# Patient Record
Sex: FEMALE | Race: WHITE
[De-identification: names, ages, dates, MRNs, and addresses within clinical notes are randomized per-mention and may not be internally consistent; named-entity substitution may affect disease eponyms.]

---

## 2019-03-12 ENCOUNTER — HOSPITAL ENCOUNTER (EMERGENCY)
Dept: HOSPITAL 41 - JD.ED | Age: 64
Discharge: HOME | End: 2019-03-12
Payer: COMMERCIAL

## 2019-03-12 DIAGNOSIS — E03.9: ICD-10-CM

## 2019-03-12 DIAGNOSIS — N39.0: Primary | ICD-10-CM

## 2019-03-12 DIAGNOSIS — Z79.899: ICD-10-CM

## 2019-03-12 PROCEDURE — 99283 EMERGENCY DEPT VISIT LOW MDM: CPT

## 2019-03-12 PROCEDURE — 87040 BLOOD CULTURE FOR BACTERIA: CPT

## 2019-03-12 PROCEDURE — 36415 COLL VENOUS BLD VENIPUNCTURE: CPT

## 2019-03-12 PROCEDURE — 96365 THER/PROPH/DIAG IV INF INIT: CPT

## 2019-03-12 PROCEDURE — 96361 HYDRATE IV INFUSION ADD-ON: CPT

## 2019-03-12 NOTE — EDM.PDOC
ED HPI GENERAL MEDICAL PROBLEM





- General


Chief Complaint: Genitourinary Problem


Stated Complaint: SENT BY  FOR IV AND ANTIBIOTICS


Time Seen by Provider: 03/12/19 17:57


Source of Information: Reports: Patient, RN Notes Reviewed


History Limitations: Reports: No Limitations





- History of Present Illness


INITIAL COMMENTS - FREE TEXT/NARRATIVE: 





Patient is a 63-year-old female who presents to the ED today for the evaluation 

of a UTI.  She was seen by Lavonne Hutchinson at the [facility] and she was found 

to have a "raging UTI" and an elevated white count at over 19,000.  The patient 

states that her symptoms started this last Thursday with dysuria, frequency, 

urgency.  She states that she was staying in a hotel somewhere of this weekend 

and noted that she had a fever.  She states she was unable to check this at the 

time because she did not have a thermometer.  She was started on oral Bactrim 

for management of the UTI and had only taken one dose of her antibiotic,  but 

was called today by Lavonne Hutchinson on the results of her blood work and 

urinalysis and was told to come to the ED for further evaluation, possibly for 

IV fluids and antibiotics.  The patient denies pain anywhere, chest pain, 

shortness of breath, she does have a small amount of nausea but has not had any 

vomiting or diarrhea.


Treatments PTA: Reports: Acetaminophen





- Related Data


 Allergies











Allergy/AdvReac Type Severity Reaction Status Date / Time


 


No Known Allergies Allergy   Verified 03/12/19 17:47











Home Meds: 


 Home Meds





Amitriptyline [Elavil] 50 mg PO DAILY 03/12/19 [History]


Ciprofloxacin [Ciprofloxacin HCl] 250 mg PO BID #14 tab 03/12/19 [Rx]


Citalopram [Citalopram HBr] 20 mg PO DAILY 03/12/19 [History]


Fluticasone Propionate [Flonase] 2 spray NASBOTH DAILY 03/12/19 [History]


Levothyroxine Sodium [Synthroid] 112 mcg PO DAILY 03/12/19 [History]


Loratadine [Claritin] 10 mg PO DAILY 03/12/19 [History]


SUMAtriptan [Sumatriptan] 1 spray MAYA ASDIRECTED PRN 03/12/19 [History]











Past Medical History


HEENT History: Reports: Impaired Vision, Sinusitis


Other HEENT History: wears eyeglasses.


Genitourinary History: Reports: UTI, Recurrent


OB/GYN History: Reports: Pregnancy


Musculoskeletal History: Reports: Fracture


Neurological History: Reports: Migraines


Endocrine/Metabolic History: Reports: Hypothyroidism


Hematologic History: Reports: Anemia





- Infectious Disease History


Infectious Disease History: Reports: Chicken Pox, Mumps





- Past Surgical History


HEENT Surgical History: Reports: Tonsillectomy


Female  Surgical History: Reports: Hysterectomy





Social & Family History





- Tobacco Use


Smoking Status *Q: Never Smoker


Second Hand Smoke Exposure: No





- Caffeine Use


Caffeine Use: Reports: Coffee, Soda





- Recreational Drug Use


Recreational Drug Use: No





ED ROS GENERAL





- Review of Systems


Review Of Systems: See Below


Constitutional: Reports: Fever.  Denies: Chills


HEENT: Reports: No Symptoms


Respiratory: Reports: No Symptoms


Cardiovascular: Reports: No Symptoms


Endocrine: Reports: No Symptoms


GI/Abdominal: Reports: Nausea.  Denies: Abdominal Pain, Constipation, Diarrhea, 

Vomiting


: Reports: Dysuria, Frequency, Urgency.  Denies: Flank Pain


Musculoskeletal: Reports: No Symptoms


Skin: Reports: No Symptoms


Neurological: Reports: No Symptoms


Psychiatric: Reports: No Symptoms


Hematologic/Lymphatic: Reports: No Symptoms


Immunologic: Reports: No Symptoms





ED EXAM, RENAL/





- Physical Exam


Exam: See Below


Exam Limited By: No Limitations


General Appearance: Alert, WD/WN, No Apparent Distress


Eye Exam: Bilateral Eye: EOMI, Normal Inspection


Ears: Normal External Exam


Nose: Normal Inspection


Throat/Mouth: Normal Inspection, Normal Lips, Normal Oropharynx, Normal Voice


Head: Atraumatic, Normocephalic


Neck: Normal Inspection


Respiratory/Chest: No Respiratory Distress, Lungs Clear, Normal Breath Sounds, 

No Accessory Muscle Use, Chest Non-Tender


Cardiovascular: Normal Peripheral Pulses, Regular Rate, Rhythm, No Murmur


GI/Abdominal: Normal Bowel Sounds, Soft, Non-Tender, No Distention, No Mass


Back Exam: Normal Inspection


Extremities: Normal Inspection, Normal Capillary Refill


Neurological: Alert, Oriented, Normal Cognition, No Motor/Sensory Deficits


Psychiatric: Normal Affect, Normal Mood


Skin Exam: Warm, Dry, Intact, Normal Color, No Rash





Course





- Vital Signs


Last Recorded V/S: 


 Last Vital Signs











Temp  97.5 F   03/12/19 18:10


 


Pulse  74   03/12/19 18:10


 


Resp  16   03/12/19 18:10


 


BP  98/67   03/12/19 18:10


 


Pulse Ox  97   03/12/19 18:10














- Orders/Labs/Meds


Orders: 


 Active Orders 24 hr











 Category Date Time Status


 


 Peripheral IV Care [RC] .AS DIRECTED Care  03/12/19 18:26 Ordered


 


 CULTURE BLOOD [BC] Stat Lab  03/12/19 18:29 Ordered


 


 CULTURE BLOOD [BC] Stat Lab  03/12/19 18:29 Ordered


 


 Sodium Chloride 0.9% [Normal Saline] 1,000 ml Med  03/12/19 18:30 Ordered





 IV ASDIRECTED   


 


 Sodium Chloride 0.9% [Saline Flush] Med  03/12/19 18:25 Ordered





 10 ml FLUSH ASDIRECTED PRN   


 


 cefTRIAXone [Rocephin] 2 gm Med  03/12/19 19:00 Active





 Sodium Chloride 0.9% [Normal Saline] 100 ml   





 IV Q24H   


 


 Blood Culture x2 Reflex Set [OM.PC] Stat Oth  03/12/19 18:28 Ordered


 


 Peripheral IV Insertion Adult [OM.PC] Routine Oth  03/12/19 18:24 Ordered








 Medication Orders





Sodium Chloride (Normal Saline)  1,000 mls @ 500 mls/hr IV ASDIRECTED MICKEY


   Last Admin: 03/12/19 18:52 Dose:  500 mls/hr


Ceftriaxone Sodium 2 gm/ (Sodium Chloride)  100 mls @ 200 mls/hr IV Q24H MICKEY


   Last Admin: 03/12/19 18:55 Dose:  200 mls/hr


Sodium Chloride (Saline Flush)  10 ml FLUSH ASDIRECTED PRN


   PRN Reason: Keep Vein Open


   Last Admin: 03/12/19 18:37 Dose:  10 ml








Meds: 


Medications











Generic Name Dose Route Start Last Admin





  Trade Name Freq  PRN Reason Stop Dose Admin


 


Sodium Chloride  1,000 mls @ 500 mls/hr  03/12/19 18:30  03/12/19 18:52





  Normal Saline  IV   500 mls/hr





  ASDIRECTED MICKEY   Administration





     





     





     





     


 


Ceftriaxone Sodium 2 gm/  100 mls @ 200 mls/hr  03/12/19 19:00  03/12/19 18:55





  Sodium Chloride  IV   200 mls/hr





  Q24H MICKEY   Administration





     





     





     





     


 


Sodium Chloride  10 ml  03/12/19 18:25  03/12/19 18:37





  Saline Flush  FLUSH   10 ml





  ASDIRECTED PRN   Administration





  Keep Vein Open   





     





     





     














Discontinued Medications














Generic Name Dose Route Start Last Admin





  Trade Name Freq  PRN Reason Stop Dose Admin


 


Ceftriaxone Sodium  2 gm  03/12/19 18:27  03/12/19 18:53





  Rocephin  IV  03/12/19 18:28  Not Given





  ONETIME ONE   





     





     





     





     


 


Ceftriaxone Sodium  Confirm  03/12/19 18:45  03/12/19 18:53





  Rocephin  Administered  03/12/19 18:46  Not Given





  Dose   





  2 gm   





  IV   





  .STK-MED ONE   





     





     





     





     


 


Sodium Chloride  Confirm  03/12/19 18:46  03/12/19 18:53





  Normal Saline  Administered  03/12/19 18:47  Not Given





  Dose   





  100 mls @ as directed   





  .ROUTE   





  .STK-MED ONE   





     





     





     





     














- Re-Assessments/Exams


Free Text/Narrative Re-Assessment/Exam: 





03/12/19 19:21


Patient presents to the ED for evaluation of urinary symptoms.  The nurse at 

initial time of triage stated that she was more septic looking.  At my time of 

evaluation the patient does not appear to exhibit these septic like symptoms.  

She states that she does have low blood pressure from time to time and does 

feel as if this might be the case today, she did have a blood pressure of 96/57 

at initial evaluation.  I have ordered IV fluids, 2 g Rocephin IV in management 

of this.  I have also ordered 2 sets of blood cultures to figure out why her 

white blood cell count might be 19.9.  Her UA at the [facility] is as follows

: Color mandeep appearance cloudy pH 6.0 specific gravity 1.020 protein urine +2 

glucose urine negative ketones urine trace Rosy Watts urine +1 occult blood 

urine +2 urobilinogen urine 4.0 nitrite positive leuk esterase +2 this was a 

clean catch urine.  Urine microscopy was sent to our facility for further 

evaluation as their facility does not have the ability to do urine microscopy 

there.  Her CBC is as follows: White blood cell count 19.9 red blood cell count 

3.9 hemoglobin 12.6 hematocrit 36.7% MCV 93.6 MCH 32.1 MCHC 34.3 Red cell 

distribution with 46.4 and platelet count of 173.  This was done without a 

differential as well.  He did have a metabolic panel done at that clinic and it 

was also sent to this this facility for further evaluation as they do not have 

the ability to run a metabolic panel at their facility.  They also did obtain a 

chest x-ray at the clinic and there was nothing acute appreciated on the two-

view x-ray.  It appears as if Lavonne did send the patient's urine in for 

culture as well.





03/12/19 20:30


Patient's antibiotic is done and she has got about half a bag of fluid.  She 

states that she feels better, I will send her home with a prescription for oral 

ciprofloxacin 250 mg by mouth twice daily for 7 days.  This will be sent to 

[facility] as per her choice.  She will pick these up in the morning.  I 

did recommend that she follow up in clinic by the end of this week as well.  

She will be notified if her urine culture results state that she needs 

different antibiotic therapy.





Departure





- Departure


Time of Disposition: 20:31


Disposition: Home, Self-Care 01


Condition: Fair


Clinical Impression: 


 UTI, Urinary tract infectious disease








- Discharge Information


*PRESCRIPTION DRUG MONITORING PROGRAM REVIEWED*: No


*COPY OF PRESCRIPTION DRUG MONITORING REPORT IN PATIENT CORNELL: No


Prescriptions: 


Ciprofloxacin [Ciprofloxacin HCl] 250 mg PO BID #14 tab


Instructions:  Urinary Tract Infection, Adult, Easy-to-Read


Referrals: 


Abimbola Hutchinson PA-C [Primary Care Provider] - 


Forms:  ED Department Discharge


Additional Instructions: 


You have been evaluated in the ED today for your urinary tract infection. 





You have been given 2 g of Rocephin IV in the ER tonight this should kick start 

her antibiotic coverage.  Also given IV fluids to help with the symptoms.





You have been provided with a prescription for ciprofloxacin this is an 

antibiotic for your UTI, has been electronic was sent to the [facility] 

please pick this up tomorrow and take as directed.





Please increase your fluid intake as this also helps flush the bacteria out of 

her system.





You will be called or notified if you need a change in antibiotics as your 

urine culture is pending.





You may take Tylenol for your symptomatic fever.  Recommend that you follow up 

at [facility] sometime in end of this week for reevaluation.





Please return to the ED if your symptoms change or worsen.





- My Orders


Last 24 Hours: 


My Active Orders





03/12/19 18:24


Peripheral IV Insertion Adult [OM.PC] Routine 





03/12/19 18:25


Sodium Chloride 0.9% [Saline Flush]   10 ml FLUSH ASDIRECTED PRN 





03/12/19 18:26


Peripheral IV Care [RC] .AS DIRECTED 





03/12/19 18:28


Blood Culture x2 Reflex Set [OM.PC] Stat 





03/12/19 18:29


CULTURE BLOOD [BC] Stat 


CULTURE BLOOD [BC] Stat 





03/12/19 18:30


Sodium Chloride 0.9% [Normal Saline] 1,000 ml IV ASDIRECTED 





03/12/19 19:00


cefTRIAXone [Rocephin] 2 gm   Sodium Chloride 0.9% [Normal Saline] 100 ml IV 

Q24H 














- Assessment/Plan


Last 24 Hours: 


My Active Orders





03/12/19 18:24


Peripheral IV Insertion Adult [OM.PC] Routine 





03/12/19 18:25


Sodium Chloride 0.9% [Saline Flush]   10 ml FLUSH ASDIRECTED PRN 





03/12/19 18:26


Peripheral IV Care [RC] .AS DIRECTED 





03/12/19 18:28


Blood Culture x2 Reflex Set [OM.PC] Stat 





03/12/19 18:29


CULTURE BLOOD [BC] Stat 


CULTURE BLOOD [BC] Stat 





03/12/19 18:30


Sodium Chloride 0.9% [Normal Saline] 1,000 ml IV ASDIRECTED 





03/12/19 19:00


cefTRIAXone [Rocephin] 2 gm   Sodium Chloride 0.9% [Normal Saline] 100 ml IV 

Q24H

## 2019-03-14 ENCOUNTER — HOSPITAL ENCOUNTER (INPATIENT)
Dept: HOSPITAL 41 - JD.ED | Age: 64
LOS: 4 days | Discharge: HOME | DRG: 689 | End: 2019-03-18
Payer: COMMERCIAL

## 2019-03-14 DIAGNOSIS — Z90.710: ICD-10-CM

## 2019-03-14 DIAGNOSIS — A41.51: ICD-10-CM

## 2019-03-14 DIAGNOSIS — Z66: ICD-10-CM

## 2019-03-14 DIAGNOSIS — Z87.440: ICD-10-CM

## 2019-03-14 DIAGNOSIS — E80.4: ICD-10-CM

## 2019-03-14 DIAGNOSIS — Z79.890: ICD-10-CM

## 2019-03-14 DIAGNOSIS — G43.909: ICD-10-CM

## 2019-03-14 DIAGNOSIS — D64.9: ICD-10-CM

## 2019-03-14 DIAGNOSIS — N17.9: ICD-10-CM

## 2019-03-14 DIAGNOSIS — K83.09: ICD-10-CM

## 2019-03-14 DIAGNOSIS — H54.7: ICD-10-CM

## 2019-03-14 DIAGNOSIS — N39.0: Primary | ICD-10-CM

## 2019-03-14 DIAGNOSIS — E03.9: ICD-10-CM

## 2019-03-14 DIAGNOSIS — E83.42: ICD-10-CM

## 2019-03-14 DIAGNOSIS — Z79.899: ICD-10-CM

## 2019-03-14 DIAGNOSIS — J98.11: ICD-10-CM

## 2019-03-14 PROCEDURE — A9537 TC99M MEBROFENIN: HCPCS

## 2019-03-14 RX ADMIN — POTASSIUM CHLORIDE SCH MEQ: 1500 TABLET, EXTENDED RELEASE ORAL at 22:25

## 2019-03-14 NOTE — EDM.PDOC
ED HPI GENERAL MEDICAL PROBLEM





- General


Chief Complaint: Fever


Stated Complaint: SENT BY DR


Time Seen by Provider: 03/14/19 16:51


Source of Information: Reports: Patient, Family, Provider


History Limitations: Reports: No Limitations





- History of Present Illness


INITIAL COMMENTS - FREE TEXT/NARRATIVE: 





The patient presents from the [place] clinic with fevers, UTI and bacteremia.  

She was seen a few days ago at the clinic in [place] for a bad UTI.  Her urine 

was positive for a UTI and her WBC was elevated at 19.  She was sent here for 

further management.  Blood cultures were obtained, fluids were given and a CMP 

was done.  She was given rocephin here and put on cipro.  She then followed up 

with Lavonne Hutchinson.  Her WBC was coming down and her Bili and liver enzymes 

were coming down.  She was given another shot of rocephin.  Her blood cultures 

came back positive for E-coli.  Those results were called to Lavonne at the 

clinic by Dr Doherty.  He felt she could be treated outpatient.  She is still 

having fevers and chills.  She is able to eat and drink.  She feels a little 

better.  Lavonne did order an US tomorrow because the patient looked a little 

jaundice.


Onset: Gradual


Duration: Day(s):


Location: Reports: Other (dysuria)


Quality: Reports: Burning


Severity: Mild


Improves with: Reports: None


Worsens with: Reports: None


Associated Symptoms: Reports: Fever/Chills.  Denies: Chest Pain, Cough, 

Headaches, Nausea/Vomiting, Shortness of Breath





- Related Data


 Allergies











Allergy/AdvReac Type Severity Reaction Status Date / Time


 


No Known Allergies Allergy   Verified 03/14/19 16:56











Home Meds: 


 Home Meds





Amitriptyline [Elavil] 50 mg PO DAILY 03/12/19 [History]


Ciprofloxacin [Ciprofloxacin HCl] 250 mg PO BID #14 tab 03/12/19 [Rx]


Citalopram [Citalopram HBr] 20 mg PO DAILY 03/12/19 [History]


Fluticasone Propionate [Flonase] 2 spray NASBOTH DAILY 03/12/19 [History]


Levothyroxine Sodium [Synthroid] 112 mcg PO DAILY 03/12/19 [History]


Loratadine [Claritin] 10 mg PO DAILY 03/12/19 [History]


SUMAtriptan [Sumatriptan] 1 spray MAYA ASDIRECTED PRN 03/12/19 [History]











Past Medical History


HEENT History: Reports: Impaired Vision, Sinusitis


Other HEENT History: wears eyeglasses.


Genitourinary History: Reports: UTI, Recurrent


OB/GYN History: Reports: Pregnancy


Musculoskeletal History: Reports: Fracture


Neurological History: Reports: Migraines


Endocrine/Metabolic History: Reports: Hypothyroidism


Hematologic History: Reports: Anemia





- Infectious Disease History


Infectious Disease History: Reports: Chicken Pox, Mumps





- Past Surgical History


HEENT Surgical History: Reports: Tonsillectomy


Female  Surgical History: Reports: Hysterectomy





Social & Family History





- Tobacco Use


Smoking Status *Q: Unknown Ever Smoked





- Caffeine Use


Caffeine Use: Reports: Coffee, Soda





ED ROS GENERAL





- Review of Systems


Review Of Systems: See Below


Constitutional: Reports: Fever, Chills


HEENT: Reports: No Symptoms


Respiratory: Reports: No Symptoms


Cardiovascular: Reports: No Symptoms


Endocrine: Reports: No Symptoms


GI/Abdominal: Reports: No Symptoms


: Reports: No Symptoms





ED EXAM, SEPSIS





- Physical Exam


Exam: See Below


Exam Limited By: No Limitations


General Appearance: Alert, No Apparent Distress


Ears: Normal External Exam


Nose: Normal Inspection


Head: Atraumatic, Normocephalic


Neck: Normal Inspection


Respiratory/Chest: No Respiratory Distress, Lungs Clear, Normal Breath Sounds


Cardiovascular: Regular Rate, Rhythm, No Edema, No Murmur


GI/Abdominal Exam: Soft, Non-Tender, No Organomegaly, No Mass


Back: Normal Inspection


Extremities: Normal Inspection





Course





- Vital Signs


Last Recorded V/S: 


 Last Vital Signs











Temp  98.3 F   03/14/19 16:53


 


Pulse  84   03/14/19 16:53


 


Resp  16   03/14/19 16:53


 


BP  110/84   03/14/19 16:53


 


Pulse Ox  98   03/14/19 16:53














- Orders/Labs/Meds


Orders: 


 Active Orders 24 hr











 Category Date Time Status


 


 Cardiac Monitoring [RC] .AS DIRECTED Care  03/14/19 17:30 Active


 


 Peripheral IV Care [RC] .AS DIRECTED Care  03/14/19 17:30 Active


 


 C-REACTIVE PROTEIN [CHEM] Stat Lab  03/14/19 17:50 Results


 


 COMPREHENSIVE METABOLIC PN,CMP [CHEM] Stat Lab  03/14/19 17:50 Results


 


 Sodium Chloride 0.9% [Normal Saline] 1,000 ml Med  03/14/19 17:30 Active





 IV ASDIRECTED   


 


 Sodium Chloride 0.9% [Saline Flush] Med  03/14/19 17:30 Active





 10 ml FLUSH ASDIRECTED PRN   


 


 Peripheral IV Insertion Adult [OM.PC] Stat Oth  03/14/19 17:30 Ordered








 Medication Orders





Sodium Chloride (Normal Saline)  1,000 mls @ 125 mls/hr IV ASDIRECTED MICKEY


   Last Admin: 03/14/19 17:56  Dose: 125 mls/hr


Sodium Chloride (Saline Flush)  10 ml FLUSH ASDIRECTED PRN


   PRN Reason: Keep Vein Open


   Last Admin: 03/14/19 17:57  Dose: 10 ml








Labs: 


 Laboratory Tests











  03/14/19 03/14/19 Range/Units





  17:50 17:50 


 


WBC  7.90   (3.98-10.04)  K/mm3


 


RBC  3.47 L   (3.98-5.22)  M/mm3


 


Hgb  10.6 L   (11.2-15.7)  gm/L


 


Hct  31.5 L   (34.1-44.9)  %


 


MCV  90.8   (79.4-94.8)  fl


 


MCH  30.5   (25.6-32.2)  pg


 


MCHC  33.7   (32.2-35.5)  g/dl


 


RDW Std Deviation  42.3   (36.4-46.3)  fL


 


Plt Count  183   (182-369)  K/mm3


 


MPV  10.7   (9.4-12.3)  fl


 


Neut % (Auto)  62.5   (34.0-71.1)  %


 


Lymph % (Auto)  16.1 L   (19.3-51.7)  %


 


Mono % (Auto)  19.6 H   (4.7-12.5)  %


 


Eos % (Auto)  0.9   (0.7-5.8)  


 


Baso % (Auto)  0.3   (0.1-1.2)  %


 


Neut # (Auto)  4.94   (1.56-6.13)  K/mm3


 


Lymph # (Auto)  1.27   (1.18-3.74)  K/mm3


 


Mono # (Auto)  1.55 H   (0.24-0.36)  K/mm3


 


Eos # (Auto)  0.07   (0.04-0.36)  K/mm3


 


Baso # (Auto)  0.02   (0.01-0.08)  K/mm3


 


Manual Slide Review  Abnormal smear   


 


Sodium   134 L  (136-145)  mEq/L


 


Potassium   3.6  (3.5-5.1)  mEq/L


 


Chloride   102  ()  mEq/L


 


Carbon Dioxide   20 L  (21-32)  mEq/L


 


Anion Gap   15.6 H  (5-15)  


 


BUN   18  (7-18)  mg/dL


 


Creatinine   1.4 H  (0.55-1.02)  mg/dL


 


Est Cr Clr Drug Dosing   TNP  


 


Estimated GFR (MDRD)   38  (>60)  mL/min


 


BUN/Creatinine Ratio   12.9 L  (14-18)  


 


Glucose   106  ()  mg/dL


 


Calcium   8.5  (8.5-10.1)  mg/dL


 


Total Bilirubin   0.8  (0.2-1.0)  mg/dL


 


AST   40 H  (15-37)  U/L


 


ALT   61 H  (14-59)  U/L


 


Alkaline Phosphatase   130 H  ()  U/L


 


Total Protein   6.5  (6.4-8.2)  g/dl


 


Albumin   2.5 L  (3.4-5.0)  g/dl


 


Globulin   4.0  gm/dL


 


Albumin/Globulin Ratio   0.6 L  (1-2)  











Meds: 


Medications











Generic Name Dose Route Start Last Admin





  Trade Name Freq  PRN Reason Stop Dose Admin


 


Sodium Chloride  1,000 mls @ 125 mls/hr  03/14/19 17:30  03/14/19 17:56





  Normal Saline  IV   125 mls/hr





  ASDIRECTED MICKEY   Administration





     





     





     





     


 


Sodium Chloride  10 ml  03/14/19 17:30  03/14/19 17:57





  Saline Flush  FLUSH   10 ml





  ASDIRECTED PRN   Administration





  Keep Vein Open   





     





     





     














Discontinued Medications














Generic Name Dose Route Start Last Admin





  Trade Name Freq  PRN Reason Stop Dose Admin


 


Ceftriaxone Sodium 2 gm/  100 mls @ 200 mls/hr  03/14/19 17:31  03/14/19 18:00





  Sodium Chloride  IV  03/14/19 18:00  Not Given





  ONETIME ONE   





     





     





     





     


 


Ceftriaxone Sodium 2 gm/  100 mls @ 200 mls/hr  03/14/19 17:45  03/14/19 17:56





  Sodium Chloride  IV  03/14/19 18:14  200 mls/hr





  ONETIME ONE   Administration





     





     





     





     














- Re-Assessments/Exams


Free Text/Narrative Re-Assessment/Exam: 





03/14/19 18:18


I ordered an IV NS at 125mL/hr, rocephin 2 grams IV, and labs.


03/14/19 18:25


Her WBC is normal now at 7.9.  ON 3/12 it was 19 and on 3/13 it was 10.92.  Her 

creatinine is still elevated at 1.4.  Her total bili has improved from 1.9 to 

0.8.  Her AST has improved from 53 to 40.  Her ALT has improved from 76 to 61.  

Her alk phos is elevated at 130.  I did order an US of her RUQ because of the 

elevated bili.  She had no pain in the RUQ though.  She told me that she last 

ate at noon but when the tech came in to do the US she admitted to eating a 

rice crispy bar at about 3:30.  They do have a spot in the morning as needed.


03/14/19 18:30


I called Dr Rubio and he agreed to the admission.





Departure





- Departure


Time of Disposition: 18:35


Disposition: Admitted As Inpatient 68


Condition: Fair


Clinical Impression: 


 UTI, Urinary tract infectious disease, Bacteremia








- Discharge Information


Referrals: 


Abimbola Hutchinson PA-C [Primary Care Provider] - 


Forms:  ED Department Discharge





- My Orders


Last 24 Hours: 


My Active Orders





03/14/19 17:30


Cardiac Monitoring [RC] .AS DIRECTED 


Peripheral IV Care [RC] .AS DIRECTED 


Sodium Chloride 0.9% [Normal Saline] 1,000 ml IV ASDIRECTED 


Sodium Chloride 0.9% [Saline Flush]   10 ml FLUSH ASDIRECTED PRN 


Peripheral IV Insertion Adult [OM.PC] Stat 





03/14/19 17:50


C-REACTIVE PROTEIN [CHEM] Stat 


COMPREHENSIVE METABOLIC PN,CMP [CHEM] Stat 














- Assessment/Plan


Last 24 Hours: 


My Active Orders





03/14/19 17:30


Cardiac Monitoring [RC] .AS DIRECTED 


Peripheral IV Care [RC] .AS DIRECTED 


Sodium Chloride 0.9% [Normal Saline] 1,000 ml IV ASDIRECTED 


Sodium Chloride 0.9% [Saline Flush]   10 ml FLUSH ASDIRECTED PRN 


Peripheral IV Insertion Adult [OM.PC] Stat 





03/14/19 17:50


C-REACTIVE PROTEIN [CHEM] Stat 


COMPREHENSIVE METABOLIC PN,CMP [CHEM] Stat

## 2019-03-14 NOTE — PCM.SN
- Free Text/Narrative


Note: 


Briefly seen and examined patient at bedside. She was recently diagnosed with 

UTI and was found to have Bacteremia on her blood culture due to E. coli. She 

has been treated outpatient with oral antibiotic and received IV Rocephin in ED 

prior to coming in to the floor. She denies having any  issues. She looks 

clinically stable and her vitals on presentation are within normal limits. She 

has no leukocytosis but her CRP is considerably high at 23.9. Patient is 

essentially coming in for treatment of Bacteremia 2/2 UTI. Our plan is to 

repeat blood culture, screen for viral infection, continue antibiotic and 

supportive care. Discharge pending result of repeat blood culture.

## 2019-03-15 RX ADMIN — POTASSIUM CHLORIDE SCH MEQ: 1500 TABLET, EXTENDED RELEASE ORAL at 00:57

## 2019-03-15 RX ADMIN — BENZOCAINE AND MENTHOL PRN LOZENGE: 15; 3.6 LOZENGE ORAL at 20:41

## 2019-03-15 RX ADMIN — Medication SCH MG: at 09:09

## 2019-03-15 RX ADMIN — BENZOCAINE AND MENTHOL PRN LOZENGE: 15; 3.6 LOZENGE ORAL at 12:16

## 2019-03-15 NOTE — PCM.HP
H&P History of Present Illness





- General


Date of Service: 03/15/19


Admit Problem/Dx: 


 Admission Diagnosis/Problem





Admission Diagnosis/Problem      Bacteremia








Source of Information: Patient, Old Records, Provider, RN, RN Notes Reviewed


History Limitations: Reports: No Limitations





- History of Present Illness


Initial Comments - Free Text/Narative: 


Bhavna Lowe is a 62 yo female who presented to our ED from [facility] with 

fevers, UTI, and bacteremia.  She was seen in the [facility] and diagnosed 

with UTI.  Her WBC was also elevated at 19.  She was sent to the ED for further 

management and at that time blood cultures were obtained fluids were given and 

a CMP was done.  She is given a dose of Rocephin and put on by mouth Cipro and 

discharged to follow up with Jessy Hutchinson PA-C.  In follow-up WBC was noted 

to be coming down along with her bilirubin and liver enzymes.  She received 

another shot of Rocephin.  Her blood cultures and returned positive for 

Escherichia coli and these were reported back to her PCP by the ED provider.  

At that time it was felt she could be treated as outpatient however she 

continued to have fever and chills.  Reports she is still able to eat and drink 

and does feel a little better.  Abdominal ultrasound was ordered and originally 

was going to be completed outpatient as it was thought she looked well jaundice.





In the ED Was 98.3.  Pulse 84.  Respirations 16.  Blood pressure 110/84.  Pulse 

ox 98%.  Labs are obtained: WBC 7.90.  Hemoglobin 10.6.  Hematocrit 31.5.  She 

is normocytic.  Neutrophils are normal at 60.5.  Sodium is just a smidge low at 

134.  Potassium 3.6.  Chloride 102.  Corrected 20.  Anion gap is 15.6.  BUN is 

18.  Creatinine is 1.4.  GFR is 38.  Glucose 106.  Calcium 8.5.  Total 

bilirubin 0.8.  AST is 40, ALT 61, alkaline phosphatase 1:30.  Protein is 6.5.  

Albumin is low at 2.5.  She started on IV fluids and 2 g Rocephin.  She is 

reporting pain in the right upper quadrant.





She carries a history of impaired vision, recurrent UTIs, migraines, 

hypothyroidism, anemia.  She is a DNR/DNI.  Her PCP is Jessy Hutchinson PA-C. 








- Related Data


Allergies/Adverse Reactions: 


 Allergies











Allergy/AdvReac Type Severity Reaction Status Date / Time


 


No Known Allergies Allergy   Verified 03/14/19 19:38











Home Medications: 


 Home Meds





Amitriptyline [Elavil] 50 mg PO DAILY 03/12/19 [History]


Ciprofloxacin [Ciprofloxacin HCl] 250 mg PO BID #14 tab 03/12/19 [Rx]


Citalopram [Citalopram HBr] 20 mg PO DAILY 03/12/19 [History]


Fluticasone Propionate [Flonase] 2 spray NASBOTH DAILY 03/12/19 [History]


Levothyroxine Sodium [Synthroid] 112 mcg PO DAILY 03/12/19 [History]


Loratadine [Claritin] 10 mg PO DAILY 03/12/19 [History]


SUMAtriptan [Sumatriptan] 1 spray MAYA ASDIRECTED PRN 03/12/19 [History]











Past Medical History


HEENT History: Reports: Impaired Vision, Sinusitis


Other HEENT History: wears eyeglasses.


Genitourinary History: Reports: UTI, Recurrent


OB/GYN History: Reports: Pregnancy


Musculoskeletal History: Reports: Fracture


Neurological History: Reports: Migraines


Endocrine/Metabolic History: Reports: Hypothyroidism


Hematologic History: Reports: Anemia





- Infectious Disease History


Infectious Disease History: Reports: Chicken Pox, Mumps





- Past Surgical History


HEENT Surgical History: Reports: Tonsillectomy


Female  Surgical History: Reports: Hysterectomy


Endocrine Surgical History: Reports: None


Neurological Surgical History: Reports: None





Social & Family History





- Family History


Family Medical History: Noncontributory





- Tobacco Use


Smoking Status *Q: Never Smoker


Second Hand Smoke Exposure: No





- Caffeine Use


Caffeine Use: Reports: None





- Recreational Drug Use


Recreational Drug Use: No





H&P Review of Systems





- Review of Systems:


Review Of Systems: See Below


General: Reports: No Symptoms.  Denies: Fever, Chills, Malaise, Weakness, 

Fatigue


HEENT: Reports: No Symptoms.  Denies: Headaches, Sore Throat


Pulmonary: Reports: No Symptoms.  Denies: Shortness of Breath, Wheezing, 

Pleuritic Chest Pain, Cough, Sputum


Cardiovascular: Reports: No Symptoms.  Denies: Chest Pain, Palpitations, 

Dyspnea on Exertion, Edema, Lightheadedness


Gastrointestinal: Reports: No Symptoms.  Denies: Abdominal Pain, Constipation, 

Diarrhea, Nausea, Vomiting


Genitourinary: Reports: No Symptoms.  Denies: Frequency, Pain


Musculoskeletal: Reports: No Symptoms


Skin: Reports: No Symptoms.  Denies: Cyanosis, Jaundice


Psychiatric: Reports: No Symptoms.  Denies: Confusion


Neurological: Reports: No Symptoms.  Denies: Trouble Speaking, Difficulty 

Walking


Hematologic/Lymphatic: Reports: No Symptoms


Immunologic: Reports: No Symptoms





Exam





- Exam


Exam: See Below





- Vital Signs


Vital Signs: 


 Last Vital Signs











Temp  99.1 F   03/15/19 09:05


 


Pulse  76   03/15/19 09:05


 


Resp  16   03/15/19 09:05


 


BP  95/68   03/15/19 09:05


 


Pulse Ox  98   03/15/19 09:05











Weight: 172 lb 1.6 oz





- Exam


Quality Assessment: DVT Prophylaxis


General: Alert, Oriented, Cooperative.  No: Mild Distress


HEENT: Conjunctiva Clear, EACs Clear, EOMI, Hearing Intact, Mucosa Moist & Pink

, Nares Patent, Posterior Pharynx Clear, PERRLA


Neck: Supple, Trachea Midline


Lungs: Clear to Auscultation, Normal Respiratory Effort


Cardiovascular: Regular Rate, Regular Rhythm


GI/Abdominal Exam: Normal Bowel Sounds, Soft, Non-Tender, No Organomegaly, No 

Distention, No Abnormal Bruit, No Mass


 (Female) Exam: Deferred


Rectal (Female) Exam: Deferred


Back Exam: Normal Inspection, Full Range of Motion


Extremities: Normal Inspection, Normal Range of Motion, Non-Tender, No Pedal 

Edema, Normal Capillary Refill


Peripheral Pulses: 2+: Radial (L), Radial (R), Dorsalis Pedis (L), Dorsalis 

Pedis (R)


Skin: Warm, Dry, Intact


Neurological: Cranial Nerves Intact (grossly)


Neuro Extensive - Mental Status: Alert, Oriented x3, Normal Mood/Affect





- Patient Data


Lab Results Last 24 hrs: 


 Laboratory Results - last 24 hr











  03/14/19 03/14/19 03/15/19 Range/Units





  17:50 17:50 05:52 


 


WBC  7.90   7.45  (3.98-10.04)  K/mm3


 


RBC  3.47 L   3.37 L  (3.98-5.22)  M/mm3


 


Hgb  10.6 L   10.2 L  (11.2-15.7)  gm/L


 


Hct  31.5 L   30.9 L  (34.1-44.9)  %


 


MCV  90.8   91.7  (79.4-94.8)  fl


 


MCH  30.5   30.3  (25.6-32.2)  pg


 


MCHC  33.7   33.0  (32.2-35.5)  g/dl


 


RDW Std Deviation  42.3   43.0  (36.4-46.3)  fL


 


Plt Count  183   186  (182-369)  K/mm3


 


MPV  10.7   11.1  (9.4-12.3)  fl


 


Neut % (Auto)  62.5   60.1  (34.0-71.1)  %


 


Lymph % (Auto)  16.1 L   17.3 L  (19.3-51.7)  %


 


Mono % (Auto)  19.6 H   19.3 H  (4.7-12.5)  %


 


Eos % (Auto)  0.9   1.7  (0.7-5.8)  


 


Baso % (Auto)  0.3   0.5  (0.1-1.2)  %


 


Neut # (Auto)  4.94   4.47  (1.56-6.13)  K/mm3


 


Lymph # (Auto)  1.27   1.29  (1.18-3.74)  K/mm3


 


Mono # (Auto)  1.55 H   1.44 H  (0.24-0.36)  K/mm3


 


Eos # (Auto)  0.07   0.13  (0.04-0.36)  K/mm3


 


Baso # (Auto)  0.02   0.04  (0.01-0.08)  K/mm3


 


Manual Slide Review  Abnormal smear   Normal smear  


 


Sodium   134 L   (136-145)  mEq/L


 


Potassium   3.6   (3.5-5.1)  mEq/L


 


Chloride   102   ()  mEq/L


 


Carbon Dioxide   20 L   (21-32)  mEq/L


 


Anion Gap   15.6 H   (5-15)  


 


BUN   18   (7-18)  mg/dL


 


Creatinine   1.4 H   (0.55-1.02)  mg/dL


 


Est Cr Clr Drug Dosing   TNP   


 


Estimated GFR (MDRD)   38   (>60)  mL/min


 


BUN/Creatinine Ratio   12.9 L   (14-18)  


 


Glucose   106   ()  mg/dL


 


Calcium   8.5   (8.5-10.1)  mg/dL


 


Magnesium     (1.8-2.4)  mg/dl


 


Total Bilirubin   0.8   (0.2-1.0)  mg/dL


 


AST   40 H   (15-37)  U/L


 


ALT   61 H   (14-59)  U/L


 


Alkaline Phosphatase   130 H   ()  U/L


 


C-Reactive Protein   23.9 H*   (<1.0)  mg/dL


 


Total Protein   6.5   (6.4-8.2)  g/dl


 


Albumin   2.5 L   (3.4-5.0)  g/dl


 


Globulin   4.0   gm/dL


 


Albumin/Globulin Ratio   0.6 L   (1-2)  


 


Urine Color     (Yellow)  


 


Urine Appearance     (Clear)  


 


Urine pH     (5.0-8.0)  


 


Ur Specific Gravity     (1.005-1.030)  


 


Urine Protein     (Negative)  


 


Urine Glucose (UA)     (Negative)  


 


Urine Ketones     (Negative)  


 


Urine Occult Blood     (Negative)  


 


Urine Nitrite     (Negative)  


 


Urine Bilirubin     (Negative)  


 


Urine Urobilinogen     (0.2-1.0)  


 


Ur Leukocyte Esterase     (Negative)  


 


Urine RBC     (0-5)  /hpf


 


Urine WBC     (0-5)  /hpf


 


Urine WBC Clumps     (NOT SEEN)  /hpf


 


Ur Epithelial Cells     (0-5)  /hpf


 


Urine Bacteria     (FEW)  /hpf


 


Urine Mucus     (FEW)  /hpf














  03/15/19 03/15/19 Range/Units





  05:52 07:20 


 


WBC    (3.98-10.04)  K/mm3


 


RBC    (3.98-5.22)  M/mm3


 


Hgb    (11.2-15.7)  gm/L


 


Hct    (34.1-44.9)  %


 


MCV    (79.4-94.8)  fl


 


MCH    (25.6-32.2)  pg


 


MCHC    (32.2-35.5)  g/dl


 


RDW Std Deviation    (36.4-46.3)  fL


 


Plt Count    (182-369)  K/mm3


 


MPV    (9.4-12.3)  fl


 


Neut % (Auto)    (34.0-71.1)  %


 


Lymph % (Auto)    (19.3-51.7)  %


 


Mono % (Auto)    (4.7-12.5)  %


 


Eos % (Auto)    (0.7-5.8)  


 


Baso % (Auto)    (0.1-1.2)  %


 


Neut # (Auto)    (1.56-6.13)  K/mm3


 


Lymph # (Auto)    (1.18-3.74)  K/mm3


 


Mono # (Auto)    (0.24-0.36)  K/mm3


 


Eos # (Auto)    (0.04-0.36)  K/mm3


 


Baso # (Auto)    (0.01-0.08)  K/mm3


 


Manual Slide Review    


 


Sodium  139   (136-145)  mEq/L


 


Potassium  4.6   (3.5-5.1)  mEq/L


 


Chloride  109 H   ()  mEq/L


 


Carbon Dioxide  21   (21-32)  mEq/L


 


Anion Gap  13.6   (5-15)  


 


BUN  15   (7-18)  mg/dL


 


Creatinine  1.2 H   (0.55-1.02)  mg/dL


 


Est Cr Clr Drug Dosing  56.81   


 


Estimated GFR (MDRD)  46   (>60)  mL/min


 


BUN/Creatinine Ratio  12.5 L   (14-18)  


 


Glucose  109   ()  mg/dL


 


Calcium  8.4 L   (8.5-10.1)  mg/dL


 


Magnesium  1.9   (1.8-2.4)  mg/dl


 


Total Bilirubin    (0.2-1.0)  mg/dL


 


AST    (15-37)  U/L


 


ALT    (14-59)  U/L


 


Alkaline Phosphatase    ()  U/L


 


C-Reactive Protein  17.8 H*   (<1.0)  mg/dL


 


Total Protein    (6.4-8.2)  g/dl


 


Albumin    (3.4-5.0)  g/dl


 


Globulin    gm/dL


 


Albumin/Globulin Ratio    (1-2)  


 


Urine Color   Yellow  (Yellow)  


 


Urine Appearance   Clear  (Clear)  


 


Urine pH   6.0  (5.0-8.0)  


 


Ur Specific Gravity   1.020  (1.005-1.030)  


 


Urine Protein   1+ H  (Negative)  


 


Urine Glucose (UA)   Negative  (Negative)  


 


Urine Ketones   Negative  (Negative)  


 


Urine Occult Blood   Trace-intact H  (Negative)  


 


Urine Nitrite   Negative  (Negative)  


 


Urine Bilirubin   Negative  (Negative)  


 


Urine Urobilinogen   1.0  (0.2-1.0)  


 


Ur Leukocyte Esterase   1+ H  (Negative)  


 


Urine RBC   0-5  (0-5)  /hpf


 


Urine WBC   5-10 H  (0-5)  /hpf


 


Urine WBC Clumps   Rare  (NOT SEEN)  /hpf


 


Ur Epithelial Cells   0-5  (0-5)  /hpf


 


Urine Bacteria   Few  (FEW)  /hpf


 


Urine Mucus   Not seen  (FEW)  /hpf











Result Diagrams: 


 03/15/19 05:52





 03/15/19 05:52


Gregory Results Last 24 hrs: 


 Microbiology











 03/14/19 21:15 Anaerobic Blood Culture - Final





 Blood - Venous - Lab Draw 














- Problem List


(1) Bacteremia


SNOMED Code(s): 4392558


   ICD Code: R78.81 - BACTEREMIA   Status: Acute   Priority: High   Current 

Visit: Yes   





(2) UTI, Urinary tract infectious disease


SNOMED Code(s): 70728876


   ICD Code: N39.0 - URINARY TRACT INFECTION, SITE NOT SPECIFIED   Status: 

Acute   Priority: High   Current Visit: Yes   





(3) Transaminitis


SNOMED Code(s): 866024861, 132153986


   ICD Code: R74.0 - NONSPEC ELEV OF LEVELS OF TRANSAMNS & LACTIC ACID 

DEHYDRGNSE   Status: Acute   Current Visit: Yes   





(4) Elevated bilirubin


SNOMED Code(s): 134314793


   ICD Code: R17 - UNSPECIFIED JAUNDICE   Status: Acute   Current Visit: Yes   


Problem List Initiated/Reviewed/Updated: Yes


Orders Last 24hrs: 


 Active Orders 24 hr











 Category Date Time Status


 


 Patient Status [ADT] Routine ADT  03/14/19 18:46 Active


 


 Antiembolic Devices [RC] BID Care  03/14/19 20:51 Active


 


 Cardiac Monitoring [RC] .AS DIRECTED Care  03/14/19 17:30 Active


 


 Height and Weight [RC] 04 Care  03/14/19 20:42 Active


 


 Intake and Output [RC] 04,16 Care  03/14/19 20:42 Active


 


 Oxygen Therapy [RC] PRN Care  03/14/19 20:42 Active


 


 RT Aerosol Therapy [RC] ASDIRECTED Care  03/14/19 20:52 Active


 


 Up With Assistance [RC] BID Care  03/14/19 20:42 Active


 


 Up ad Marie [RC] BID Care  03/14/19 20:42 Active


 


 VTE/DVT Education [RC] BID Care  03/14/19 20:42 Active


 


 Vital Signs [RC] 00,04,08,12,16,20 Care  03/14/19 20:42 Active


 


 Consult to Case Management/ [CONS] Cons  03/14/19 20:42 Active





 Routine   


 


 Consult to Spiritual Care [CONS] Routine Cons  03/14/19 20:42 Active


 


 OT Evaluation and Treatment [CONS] Routine Cons  03/14/19 20:42 Active


 


 PT Evaluation and Treatment [CONS] Routine Cons  03/14/19 20:42 Active


 


 BASIC METABOLIC PANEL,BMP [CHEM] AM Lab  03/16/19 05:11 Ordered


 


 BASIC METABOLIC PANEL,BMP [CHEM] AM Lab  03/17/19 05:11 Ordered


 


 BASIC METABOLIC PANEL,BMP [CHEM] AM Lab  03/18/19 05:11 Ordered


 


 C-REACTIVE PROTEIN [CHEM] AM Lab  03/16/19 05:11 Ordered


 


 C-REACTIVE PROTEIN [CHEM] AM Lab  03/17/19 05:11 Ordered


 


 C-REACTIVE PROTEIN [CHEM] AM Lab  03/18/19 05:11 Ordered


 


 CBC WITH AUTO DIFF [HEME] AM Lab  03/16/19 05:11 Ordered


 


 CBC WITH AUTO DIFF [HEME] AM Lab  03/17/19 05:11 Ordered


 


 CBC WITH AUTO DIFF [HEME] AM Lab  03/18/19 05:11 Ordered


 


 CULTURE BLOOD [BC] Stat Lab  03/14/19 21:15 Results


 


 CULTURE BLOOD [BC] Stat Lab  03/14/19 21:20 Received


 


 CULTURE URINE [RM] Routine Lab  03/15/19 07:20 Received


 


 MAGNESIUM [CHEM] AM Lab  03/16/19 05:11 Ordered


 


 MAGNESIUM [CHEM] AM Lab  03/17/19 05:11 Ordered


 


 MAGNESIUM [CHEM] AM Lab  03/18/19 05:11 Ordered


 


 RESPIRATORY PANEL Stat Lab  03/15/19 00:40 Received


 


 Acetaminophen [Tylenol] Med  03/14/19 20:42 Active





 650 mg PO Q4H PRN   


 


 Acetaminophen/HYDROcodone [Norco 325-5 MG] Med  03/14/19 20:42 Active





 1 tab PO Q4H PRN   


 


 Albuterol/Ipratropium [DuoNeb 3.0-0.5 MG/3 ML] Med  03/14/19 20:42 Active





 3 ml NEB Q4H PRN   


 


 Amitriptyline [Elavil] Med  03/15/19 21:00 Active





 50 mg PO BEDTIME   


 


 Benzocaine/Cetylpyrd/Menthol [Cepacol Sore Throat] Med  03/15/19 09:18 Active





 1 lozenge MUCMEM Q2H PRN   


 


 Bisacodyl [Dulcolax] Med  03/14/19 20:42 Active





 5 mg PO DAILY PRN   


 


 Citalopram [Celexa] Med  03/15/19 09:00 Active





 20 mg PO DAILY   


 


 Docusate Sodium [Colace] Med  03/14/19 20:42 Active





 100 mg PO BID PRN   


 


 Docusate Sodium/Sennosides [Senna Plus] Med  03/14/19 20:42 Active





 1 tab PO BID PRN   


 


 Flunisolide [Nasalide Nasal Irons] Med  03/15/19 09:00 Active





 0 ml NASBOTH Q12H   


 


 HYDROmorphone [Dilaudid] Med  03/14/19 20:42 Active





 0.25 mg IVPUSH Q2H PRN   


 


 LORazepam [Ativan] Med  03/14/19 20:42 Active





 0.5 mg IV Q6H PRN   


 


 LORazepam [Ativan] Med  03/14/19 20:41 Active





 2 mg IVPUSH Q4H PRN   


 


 Levothyroxine Med  03/15/19 06:00 Active





 112 mcg PO ACBREAKFAST   


 


 Loratadine [Claritin] Med  03/15/19 09:00 Active





 10 mg PO DAILY   


 


 Metoprolol Tartrate [Lopressor] Med  03/14/19 20:41 Active





 5 mg IVPUSH Q4H PRN   


 


 Ondansetron [Zofran] Med  03/14/19 20:42 Active





 4 mg IV Q6H PRN   


 


 Patient's Own Medication [Ptom] Med  03/15/19 07:08 Active





 0 each MAYA ASDIRECTED PRN   


 


 Pharmacy to Dose - Magnesium R [Pharmacy to Dose - Med  03/14/19 20:45 Active





 Magnesium Replacement]   





 1 dose .XX ASDIRECTED   


 


 Pharmacy to Dose - Potassium R [Pharmacy to Dose - Med  03/14/19 20:45 Active





 Potassium Replacement]   





 1 dose .XX ASDIRECTED   


 


 Polyethylene Glycol 3350 [MiraLAX] Med  03/14/19 20:42 Active





 17 gm PO DAILY PRN   


 


 Promethazine [Phenergan] 6.25 mg Med  03/14/19 20:42 Active





 Sodium Chloride 0.9% [Normal Saline] 50 ml   





 IV Q6H   


 


 Saccharomyces Boulardii [Florastor] Med  03/15/19 09:00 Active





 250 mg PO DAILY   


 


 Sodium Chloride 0.9% [Normal Saline] 1,000 ml Med  03/14/19 17:30 Active





 IV ASDIRECTED   


 


 Sodium Chloride 0.9% [Saline Flush] Med  03/14/19 17:30 Active





 10 ml FLUSH ASDIRECTED PRN   


 


 Temazepam [Restoril] Med  03/14/19 20:42 Active





 7.5 mg PO BEDTIME PRN   


 


 cefTRIAXone [Rocephin] 2 gm Med  03/15/19 18:00 Active





 Sodium Chloride 0.9% [Normal Saline] 100 ml   





 IV Q24H   


 


 hydrALAZINE [Apresoline] Med  03/14/19 20:41 Active





 20 mg IVPUSH Q4H PRN   


 


 Blood Culture x2 Reflex Set [OM.PC] Stat Oth  03/14/19 20:42 Ordered


 


 Peripheral IV Insertion Adult [OM.PC] Stat Oth  03/14/19 17:30 Ordered


 


 Sequential Compression Device [OM.PC] Per Unit Routine Oth  03/14/19 20:46 

Ordered


 


 Resuscitation Status Routine Resus Stat  03/14/19 21:26 Ordered








 Medication Orders





Acetaminophen (Tylenol)  650 mg PO Q4H PRN


   PRN Reason: Pain (Mild 1-3)/fever


   Last Admin: 03/15/19 01:05  Dose: 650 mg


Hydrocodone Bitart/Acetaminophen (Norco 325-5 Mg)  1 tab PO Q4H PRN


   PRN Reason: Pain (moderate 4-6)


Albuterol/Ipratropium (Duoneb 3.0-0.5 Mg/3 Ml)  3 ml NEB Q4H PRN


   PRN Reason: Shortness Of Breath/wheezing


Amitriptyline HCl (Elavil)  50 mg PO BEDTIME [facility]


Benzocaine/Menthol (Cepacol Sore Throat)  1 lozenge MUCMEM Q2H PRN


   PRN Reason: Sore Throat


   Last Admin: 03/15/19 12:16  Dose: 1 lozenge


Bisacodyl (Dulcolax)  5 mg PO DAILY PRN


   PRN Reason: Constipation


Citalopram Hydrobromide (Celexa)  20 mg PO DAILY [facility]


   Last Admin: 03/15/19 09:10  Dose: 20 mg


Docusate Sodium (Colace)  100 mg PO BID PRN


   PRN Reason: Constipation


Flunisolide (Nasalide Nasal Spray)  0 ml NASBOTH Q12H [facility]


   Last Admin: 03/15/19 09:10  Dose: 2 spray


Hydralazine HCl (Apresoline)  20 mg IVPUSH Q4H PRN


   PRN Reason: Hypertension


Hydromorphone HCl (Dilaudid)  0.25 mg IVPUSH Q2H PRN


   PRN Reason: Pain (severe 7-10)


Sodium Chloride (Normal Saline)  1,000 mls @ 125 mls/hr IV ASDIRECTED [facility]


   Last Admin: 03/15/19 09:11  Dose: 125 mls/hr


   Infusion: 03/15/19 09:11  Dose: 125 mls/hr


   Admin: 03/15/19 02:27  Dose: 125 mls/hr


   Infusion: 03/15/19 01:56  Dose: 125 mls/hr


   Admin: 03/14/19 17:56  Dose: 125 mls/hr


Promethazine HCl 6.25 mg/ (Sodium Chloride)  50.25 mls @ 100 mls/hr IV Q6H PRN


   PRN Reason: Nausea/Vomiting


Ceftriaxone Sodium 2 gm/ (Sodium Chloride)  100 mls @ 200 mls/hr IV Q24H [facility]


Levothyroxine Sodium (Levothyroxine)  112 mcg PO ACBREAKFAST [facility]


   Last Admin: 03/15/19 07:04  Dose: 112 mcg


Loratadine (Claritin)  10 mg PO DAILY [facility]


   Last Admin: 03/15/19 09:10  Dose: 10 mg


Lorazepam (Ativan)  2 mg IVPUSH Q4H PRN


   PRN Reason: Seizures


Lorazepam (Ativan)  0.5 mg IV Q6H PRN


   PRN Reason: Anxiety


Magnesium Sulfate (Pharmacy To Dose - Magnesium Replacement)  1 dose .XX 

ASDIRECTED [facility]


Metoprolol Tartrate (Lopressor)  5 mg IVPUSH Q4H PRN


   PRN Reason: Tachycardia


Ondansetron HCl (Zofran)  4 mg IV Q6H PRN


   PRN Reason: Nausea/Vomiting


Sumatriptan [ (Sumatriptan] 1 Spray)  0 each MAYA ASDIRECTED PRN


   PRN Reason: Headache


Polyethylene Glycol (Miralax)  17 gm PO DAILY PRN


   PRN Reason: Constipation


Potassium Chloride (Pharmacy To Dose - Potassium Replacement)  1 dose .XX 

ASDIRECTED [facility]


Saccharomyces Boulardii (Florastor)  250 mg PO DAILY [facility]


   Last Admin: 03/15/19 09:09  Dose: 250 mg


Senna/Docusate Sodium (Senna Plus)  1 tab PO BID PRN


   PRN Reason: Constipation


Sodium Chloride (Saline Flush)  10 ml FLUSH ASDIRECTED PRN


   PRN Reason: Keep Vein Open


   Last Admin: 03/14/19 17:57  Dose: 10 ml


Temazepam (Restoril)  7.5 mg PO BEDTIME PRN


   PRN Reason: Sleep








Assessment/Plan Comment:: 


I/P:





Acute:





UTI


   -Failed outpatient treatment 


   -Was seen in clinic and ED for UTI. Continued to have fever and chills


   -Was noted to be bacteremic and sent here for treatment


   -UA here shows 1+ protein, trace intact occult blood, 1+ leukocyte esterase, 

5-10 WBCs.


   -4/4 blood cultures positive for E. Coli with some resistance, however 

sensitive to Rocephin


   -UC growing E. Coli sensitive to rocephin


   -Rocephin given in ED - continue


   -IV fluids as ordered


   -No leukocytosis


   -CRP 23.9-->17.8


   -Probiotic


   -Tylenol for fever/pain





Bacteremia


   -4/4 cultures positive for E. Coli with some resistances 


   -2/2 UTI


   -Treatment as above


   -Repeat blood cultures drawn 48 Hours after treatment


   -IV fluids as ordered





Weakness/fatigue


   -Likely 2/2 above


   -VRP ordered and pending


   -IV fluids as above





MARIA TERESA (Labs from clinic and here)


   -2/2 poor oral intake and medications


   -On multiple anticholinergic drugs


   -BUN 24-->19-->18-->15


   -Creatinine 1.5-->1.4-->1.4-->1.3


   -eGFR 35-->38-->38-->46 


   -IV fluids as ordered   





Elevated bilirubin/Transaminitis


   -Bilirubin noted to be 1.9 in clinic and provider believed patient looked 

jaundiced


   -Does not appear jaundiced here


   -Bilirubin here 1.6-->0.8


   -Liver enzymes (clinic and here)


      -AST 53-->42-->40


      -ALT 76-->63-->61


      -Alk Phos 104-->116-->130


   -Abdominal US was ordered outpatient but ultimately obtained here on 3/15/19


 * 1.  Slightly dilated CBD with no additional biliary abnormality being seen.  

MRCP could be obtained to further evaluate.


 * 2.  Other portions of the right upper quadrant abdominal ultrasound are 

unremarkable.


 -No abdominal pain


 -MRCP obtained 3/15/19:


 * 1.  Mildly dilated CHD and CBD with no filling deficits seen to indicate 

retained stone as an etiology.  Difficult to exclude stenosis at the sphincter 

of OT as an etiology.  Formal ERCP could be considered to further evaluate.


 -Will attempt to contact GI in [place] for further guidance


 -Likely will need follow-up outpatient if remains asymptomatic


 -Monitor CMPs


 


Chronic:


Impaired vision


Recurrent UTIs


Migraines


Hypothyroidism


Anemia





Plan:


Admit to medical floor  


PT/OT consult


Spiritual care consult


CM/SW for discharge planning


Other orders as indicated above


Home medications as ordered


Routine AM labs


DVT prophylaxis:SCDs; PCP: Jessy Hutchinson PA-C

## 2019-03-15 NOTE — MR
MRI abdomen (without contrast)

 

Technique: Various sequences were obtained in axial and coronal planes

 as well as MR cholangiogram exam.

 

Findings: Common bile duct is dilated up to 1.2 cm.  Common hepatic 

duct is also dilated up to 1.0 cm.  Pancreatic duct appears mildly 

prominent 4.5 mm.  There are no filling defects seen within the distal

 CBD to indicate an etiology of obstructing stone.  Mild stenosis at 

the sphincter of Jorge is possible as an etiology.  Gallbladder 

contains no filling defects to indicate gallstones.

 

Other visualized portions of the upper abdominal structures appear 

within normal limits.

 

Impression:

1.  Mildly dilated CHD and CBD with no filling defects seen to 

indicate retained stone as an etiology.  Difficult to exclude stenosis

 at the sphincter of Jorge as an etiology.  Formal ERCP could be 

considered to further evaluate.  

 

Diagnostic code #3

## 2019-03-15 NOTE — US
Limited abdominal ultrasound: Multiple real-time images of the upper 

right abdomen were obtained.

 

Comparison: No prior abdominal imaging.

 

Pancreas appears within normal limits.  Liver shows no focal 

parenchymal abnormality.  Gallbladder contains no shadowing 

gallstones.  No gallbladder wall thickening is seen.  Common bile duct

 is slightly prominent at 9-10 mm, etiology of this is not seen.

 

Right kidney shows no hydronephrosis or mass and has a length of 10.5 

cm.  Portal vein shows normal hepatopedal flow.

 

Impression:

1.  Slightly dilated CBD with no additional biliary abnormality being 

seen.  MRCP could be obtained to further evaluate.

2.  Other portions of the right upper quadrant abdominal ultrasound 

are unremarkable.

 

Diagnostic code #3

## 2019-03-16 RX ADMIN — BENZOCAINE AND MENTHOL PRN LOZENGE: 15; 3.6 LOZENGE ORAL at 21:31

## 2019-03-16 RX ADMIN — Medication SCH MG: at 09:26

## 2019-03-16 NOTE — PCM.PN
- General Info


Date of Service: 03/16/19


Admission Dx/Problem (Free Text): 


 Admission Diagnosis/Problem





Admission Diagnosis/Problem      Bacteremia








Subjective Update: 


Follow Up





Functional Status: Reports: Pain Controlled, Tolerating Diet, Ambulating, 

Urinating.  Denies: New Symptoms





- Review of Systems


General: Denies: Fever, Weakness, Fatigue, Malaise, Chills


HEENT: Reports: No Symptoms


Pulmonary: Denies: Shortness of Breath


Cardiovascular: Denies: Chest Pain


Gastrointestinal: Denies: Abdominal Pain, Decreased Appetite, Diarrhea, 

Difficulty Swallowing, Nausea, Vomiting


Genitourinary: Denies: Dysuria, Frequency, Burning, Pain


Musculoskeletal: Reports: No Symptoms


Skin: Denies: Cyanosis, Mottled, Pallor, Diaphoresis, Bruising


Neurological: Reports: No Symptoms.  Denies: Difficulty Walking, Weakness


Psychiatric: Reports: No Symptoms.  Denies: Depression, Anxiety, Agitation


Systems Review Comment:: 


No overnight or acute issues. She rested well and feels pretty good this AM. 

She is afebrile but her WBC has gone back up to 10.90. Her CRP is slightly to 

18 from 17.8. Her liver enzymes also gone up from 40/61 to 118/131 with 

elevated GGT level of 156. He has no complaints.








- Patient Data


Vitals - Most Recent: 


 Last Vital Signs











Temp  36.6 C   03/16/19 11:03


 


Pulse  69   03/16/19 11:03


 


Resp  16   03/16/19 11:03


 


BP  109/59 L  03/16/19 11:03


 


Pulse Ox  98   03/16/19 11:03











Weight - Most Recent: 78.608 kg


I&O - Last 24 Hours: 


 Intake & Output











 03/15/19 03/16/19 03/16/19





 22:59 06:59 14:59


 


Intake Total 2956 2196 120


 


Output Total 500  


 


Balance 2456 2196 120











Lab Results Last 24 Hours: 


 Laboratory Results - last 24 hr











  03/15/19 03/16/19 03/16/19 Range/Units





  00:40 04:50 04:57 


 


WBC    10.90 H  (3.98-10.04)  K/mm3


 


RBC    3.25 L  (3.98-5.22)  M/mm3


 


Hgb    9.8 L  (11.2-15.7)  gm/L


 


Hct    29.8 L  (34.1-44.9)  %


 


MCV    91.7  (79.4-94.8)  fl


 


MCH    30.2  (25.6-32.2)  pg


 


MCHC    32.9  (32.2-35.5)  g/dl


 


RDW Std Deviation    43.6  (36.4-46.3)  fL


 


Plt Count    201  (182-369)  K/mm3


 


MPV    11.0  (9.4-12.3)  fl


 


Neut % (Auto)    72.8 H  (34.0-71.1)  %


 


Lymph % (Auto)    8.8 L  (19.3-51.7)  %


 


Mono % (Auto)    15.4 H  (4.7-12.5)  %


 


Eos % (Auto)    1.0  (0.7-5.8)  


 


Baso % (Auto)    0.4  (0.1-1.2)  %


 


Neut # (Auto)    7.94 H  (1.56-6.13)  K/mm3


 


Lymph # (Auto)    0.96 L  (1.18-3.74)  K/mm3


 


Mono # (Auto)    1.68 H  (0.24-0.36)  K/mm3


 


Eos # (Auto)    0.11  (0.04-0.36)  K/mm3


 


Baso # (Auto)    0.04  (0.01-0.08)  K/mm3


 


Manual Slide Review    Normal smear  


 


Sodium   140   (136-145)  mEq/L


 


Potassium   4.0   (3.5-5.1)  mEq/L


 


Chloride   109 H   ()  mEq/L


 


Carbon Dioxide   20 L   (21-32)  mEq/L


 


Anion Gap   15.0   (5-15)  


 


BUN   10   (7-18)  mg/dL


 


Creatinine   1.0   (0.55-1.02)  mg/dL


 


Est Cr Clr Drug Dosing   68.28   mL/min


 


Estimated GFR (MDRD)   56   (>60)  mL/min


 


BUN/Creatinine Ratio   10.0 L   (14-18)  


 


Glucose   103   ()  mg/dL


 


Calcium   8.2 L   (8.5-10.1)  mg/dL


 


Magnesium   1.7 L   (1.8-2.4)  mg/dl


 


Total Bilirubin   0.7   (0.2-1.0)  mg/dL


 


GGT     (5-55)  U/L


 


AST   118 H   (15-37)  U/L


 


ALT   131 H   (14-59)  U/L


 


Alkaline Phosphatase   186 H   ()  U/L


 


C-Reactive Protein   18.0 H*   (<1.0)  mg/dL


 


Total Protein   6.2 L   (6.4-8.2)  g/dl


 


Albumin   2.2 L   (3.4-5.0)  g/dl


 


Globulin   4.0   gm/dL


 


Albumin/Globulin Ratio   0.6 L   (1-2)  


 


Adenovirus (PCR)  Not detected    (Not Detected)  


 


B. pertussis DNA (PCR)  Not detected    (Not Detected)  


 


B.parapertussis DNA PCR  Not detected    (Not Detected)  


 


C. pneumoniae DNA (PCR)  Not detected    (Not Detected)  


 


Coronavirus (PCR)  Not detected    (Not Detected)  


 


Human Metapneumovir PCR  Not detected    (Not Detected)  


 


Influenza A (RT-PCR)  Not detected    (Not Detected)  


 


Influenza B (RT-PCR)  Not detected    (Not Detected)  


 


M. pneumoniae (PCR)  Not detected    (Not Detected)  


 


Parainfluen 1,2,3,4 PCR  Not detected    (Not Detected)  


 


RSV (PCR)  Not detected    (Not Detected)  


 


Entero/Rhino (PCR)  Not detected    (Not Detected)  














  03/16/19 Range/Units





  04:57 


 


WBC   (3.98-10.04)  K/mm3


 


RBC   (3.98-5.22)  M/mm3


 


Hgb   (11.2-15.7)  gm/L


 


Hct   (34.1-44.9)  %


 


MCV   (79.4-94.8)  fl


 


MCH   (25.6-32.2)  pg


 


MCHC   (32.2-35.5)  g/dl


 


RDW Std Deviation   (36.4-46.3)  fL


 


Plt Count   (182-369)  K/mm3


 


MPV   (9.4-12.3)  fl


 


Neut % (Auto)   (34.0-71.1)  %


 


Lymph % (Auto)   (19.3-51.7)  %


 


Mono % (Auto)   (4.7-12.5)  %


 


Eos % (Auto)   (0.7-5.8)  


 


Baso % (Auto)   (0.1-1.2)  %


 


Neut # (Auto)   (1.56-6.13)  K/mm3


 


Lymph # (Auto)   (1.18-3.74)  K/mm3


 


Mono # (Auto)   (0.24-0.36)  K/mm3


 


Eos # (Auto)   (0.04-0.36)  K/mm3


 


Baso # (Auto)   (0.01-0.08)  K/mm3


 


Manual Slide Review   


 


Sodium   (136-145)  mEq/L


 


Potassium   (3.5-5.1)  mEq/L


 


Chloride   ()  mEq/L


 


Carbon Dioxide   (21-32)  mEq/L


 


Anion Gap   (5-15)  


 


BUN   (7-18)  mg/dL


 


Creatinine   (0.55-1.02)  mg/dL


 


Est Cr Clr Drug Dosing   mL/min


 


Estimated GFR (MDRD)   (>60)  mL/min


 


BUN/Creatinine Ratio   (14-18)  


 


Glucose   ()  mg/dL


 


Calcium   (8.5-10.1)  mg/dL


 


Magnesium   (1.8-2.4)  mg/dl


 


Total Bilirubin   (0.2-1.0)  mg/dL


 


GGT  156 H  (5-55)  U/L


 


AST   (15-37)  U/L


 


ALT   (14-59)  U/L


 


Alkaline Phosphatase   ()  U/L


 


C-Reactive Protein   (<1.0)  mg/dL


 


Total Protein   (6.4-8.2)  g/dl


 


Albumin   (3.4-5.0)  g/dl


 


Globulin   gm/dL


 


Albumin/Globulin Ratio   (1-2)  


 


Adenovirus (PCR)   (Not Detected)  


 


B. pertussis DNA (PCR)   (Not Detected)  


 


B.parapertussis DNA PCR   (Not Detected)  


 


C. pneumoniae DNA (PCR)   (Not Detected)  


 


Coronavirus (PCR)   (Not Detected)  


 


Human Metapneumovir PCR   (Not Detected)  


 


Influenza A (RT-PCR)   (Not Detected)  


 


Influenza B (RT-PCR)   (Not Detected)  


 


M. pneumoniae (PCR)   (Not Detected)  


 


Parainfluen 1,2,3,4 PCR   (Not Detected)  


 


RSV (PCR)   (Not Detected)  


 


Entero/Rhino (PCR)   (Not Detected)  











Gregory Results Last 24 Hours: 


 Microbiology











 03/15/19 07:20 Urine Culture - Preliminary





 Urine, Clean Catch    NO GROWTH AFTER 1 DAY


 


 03/14/19 21:15 Aerobic Blood Culture - Preliminary





 Blood - Venous - Lab Draw    NO GROWTH AFTER 1 DAY





 Anaerobic Blood Culture - Final


 


 03/14/19 21:20 Aerobic Blood Culture - Preliminary





 Blood - Venous    NO GROWTH AFTER 1 DAY





 Anaerobic Blood Culture - Preliminary





    NO GROWTH AFTER 1 DAY











Med Orders - Current: 


 Current Medications





Acetaminophen (Tylenol)  650 mg PO Q4H PRN


   PRN Reason: Pain (Mild 1-3)/fever


   Last Admin: 03/16/19 11:16 Dose:  650 mg


Hydrocodone Bitart/Acetaminophen (Norco 325-5 Mg)  1 tab PO Q4H PRN


   PRN Reason: Pain (moderate 4-6)


Albuterol/Ipratropium (Duoneb 3.0-0.5 Mg/3 Ml)  3 ml NEB Q4H PRN


   PRN Reason: Shortness Of Breath/wheezing


Amitriptyline HCl (Elavil)  50 mg PO BEDTIME [facility]


   Last Admin: 03/15/19 20:40 Dose:  50 mg


Benzocaine/Menthol (Cepacol Sore Throat)  1 lozenge MUCMEM Q2H PRN


   PRN Reason: Sore Throat


   Last Admin: 03/15/19 20:41 Dose:  1 lozenge


Bisacodyl (Dulcolax)  5 mg PO DAILY PRN


   PRN Reason: Constipation


Citalopram Hydrobromide (Celexa)  20 mg PO DAILY [facility]


   Last Admin: 03/16/19 09:26 Dose:  20 mg


Docusate Sodium (Colace)  100 mg PO BID PRN


   PRN Reason: Constipation


Flunisolide (Nasalide Nasal Spray)  0 ml NASBOTH Q12H [facility]


   Last Admin: 03/15/19 20:40 Dose:  2 spray


Hydralazine HCl (Apresoline)  20 mg IVPUSH Q4H PRN


   PRN Reason: Hypertension


Hydromorphone HCl (Dilaudid)  0.25 mg IVPUSH Q2H PRN


   PRN Reason: Pain (severe 7-10)


Sodium Chloride (Normal Saline)  1,000 mls @ 125 mls/hr IV ASDIRECTED [facility]


   Last Admin: 03/16/19 11:20 Dose:  125 mls/hr


Promethazine HCl 6.25 mg/ (Sodium Chloride)  50.25 mls @ 100 mls/hr IV Q6H PRN


   PRN Reason: Nausea/Vomiting


Ceftriaxone Sodium 2 gm/ (Sodium Chloride)  100 mls @ 200 mls/hr IV Q24H [facility]


Piperacillin Sod/Tazobactam (Sod 4.5 gm/ Sodium Chloride)  100 mls @ 25 mls/hr 

IV Q8H [facility]


Levothyroxine Sodium (Levothyroxine)  112 mcg PO ACBREAKFAST [facility]


   Last Admin: 03/16/19 07:18 Dose:  112 mcg


Loratadine (Claritin)  10 mg PO DAILY [facility]


   Last Admin: 03/16/19 09:26 Dose:  10 mg


Lorazepam (Ativan)  2 mg IVPUSH Q4H PRN


   PRN Reason: Seizures


Lorazepam (Ativan)  0.5 mg IV Q6H PRN


   PRN Reason: Anxiety


Magnesium Sulfate (Pharmacy To Dose - Magnesium Replacement)  1 dose .XX 

ASDIRECTED [facility]


Metoprolol Tartrate (Lopressor)  5 mg IVPUSH Q4H PRN


   PRN Reason: Tachycardia


Ondansetron HCl (Zofran)  4 mg IV Q6H PRN


   PRN Reason: Nausea/Vomiting


Sumatriptan [ (Sumatriptan] 1 Spray)  0 each MAYA ASDIRECTED PRN


   PRN Reason: Headache


Polyethylene Glycol (Miralax)  17 gm PO DAILY PRN


   PRN Reason: Constipation


Potassium Chloride (Pharmacy To Dose - Potassium Replacement)  1 dose .XX 

ASDIRECTED [facility]


Saccharomyces Boulardii (Florastor)  250 mg PO DAILY [facility]


   Last Admin: 03/16/19 09:26 Dose:  250 mg


Senna/Docusate Sodium (Senna Plus)  1 tab PO BID PRN


   PRN Reason: Constipation


Sodium Chloride (Saline Flush)  10 ml FLUSH ASDIRECTED PRN


   PRN Reason: Keep Vein Open


   Last Admin: 03/14/19 17:57 Dose:  10 ml


Temazepam (Restoril)  7.5 mg PO BEDTIME PRN


   PRN Reason: Sleep


   Last Admin: 03/15/19 20:41 Dose:  7.5 mg





Discontinued Medications





Ceftriaxone Sodium 2 gm/ (Sodium Chloride)  100 mls @ 200 mls/hr IV ONETIME ONE


   Stop: 03/14/19 18:00


   Last Admin: 03/14/19 18:00 Dose:  Not Given


Ceftriaxone Sodium 2 gm/ (Sodium Chloride)  100 mls @ 200 mls/hr IV ONETIME ONE


   Stop: 03/14/19 18:14


   Last Admin: 03/14/19 17:56 Dose:  200 mls/hr


Ceftriaxone Sodium 2 gm/ (Sodium Chloride)  100 mls @ 200 mls/hr IV Q24H [facility]


   Stop: 03/15/19 18:01


   Last Admin: 03/15/19 17:56 Dose:  200 mls/hr


Piperacillin Sod/Tazobactam (Sod 4.5 gm/ Sodium Chloride)  100 mls @ 200 mls/hr 

IV ONETIME ONE


   Stop: 03/16/19 09:29


   Last Admin: 03/16/19 09:21 Dose:  200 mls/hr


Magnesium Oxide (Magnesium Oxide)  800 mg PO ONETIME ONE


   Stop: 03/16/19 11:01


   Last Admin: 03/16/19 11:16 Dose:  800 mg


Potassium Chloride (Klor-Con M20)  40 meq PO Q4H [facility]


   Stop: 03/15/19 01:46


   Last Admin: 03/15/19 00:57 Dose:  40 meq











- Exam


General: Alert, Oriented, Cooperative, No Acute Distress


HEENT: Pupils Equal, Pupils Reactive, EOMI, Mucous Membr. Moist/Pink.  No: 

Scleral Icterus


Neck: Supple


Lungs: Clear to Auscultation, Normal Respiratory Effort


Cardiovascular: Regular Rate, Regular Rhythm


GI/Abdominal Exam: Normal Bowel Sounds, Soft, Non-Tender, No Organomegaly, No 

Distention, No Abnormal Bruit, No Mass, Other (Negative Badillo's Sign).  No: 

Guarding, Rigid, Rebound, Tender, Hepatomegaly, Splenomegaly


 (Female) Exam: Deferred


Back Exam: Normal Inspection, Decreased Range of Motion


Extremities: Normal Inspection, Normal Range of Motion, Non-Tender, No Pedal 

Edema, Normal Capillary Refill


Peripheral Pulses: 3+: Dorsalis Pedis (L), Dorsalis Pedis (R)


Skin: Warm, Dry, Intact


Neurological: No New Focal Deficit, Normal Gait


Psy/Mental Status: Alert, Normal Affect, Normal Mood





- Problem List Review


Problem List Initiated/Reviewed/Updated: Yes





- My Orders


Last 24 Hours: 


My Active Orders





03/15/19 21:00


Amitriptyline [Elavil]   50 mg PO BEDTIME 





03/16/19 09:30


HEPATITIS PANEL (4) [REF] Routine 





03/16/19 17:00


Piperacillin/Tazobactam [Piperacil-Tazobact] 4.5 gm   Sodium Chloride 0.9% [

Normal Saline] 100 ml IV Q8H 





03/16/19 18:00


cefTRIAXone [Rocephin] 2 gm   Sodium Chloride 0.9% [Normal Saline] 100 ml IV 

Q24H 





03/17/19 05:11


C-REACTIVE PROTEIN [CHEM] AM 


CBC WITH AUTO DIFF [HEME] AM 


MAGNESIUM [CHEM] AM 





03/18/19 05:11


C-REACTIVE PROTEIN [CHEM] AM 


CBC WITH AUTO DIFF [HEME] AM 


MAGNESIUM [CHEM] AM 














- Plan


Plan:: 


I/P:





Acute:


UTI


   -Failed outpatient treatment 


   -Was seen in clinic and ED for UTI. Continued to have fever and chills


   -Was noted to be bacteremic and sent here for treatment


   -UA here shows 1+ protein, trace intact occult blood, 1+ leukocyte esterase, 

5-10 WBCs.


   -4/4 blood cultures positive for E. Coli with some resistance, however 

sensitive to Rocephin


   -UC growing E. Coli sensitive to rocephin


   -Rocephin given in ED - continue


   -IV fluids as ordered


   -No leukocytosis


   -CRP 23.9-->17.8


   -Probiotic


   -Tylenol for fever/pain





Bacteremia


   -4/4 cultures positive for E. Coli with some resistances 


   -2/2 UTI


   -Treatment as above


   -Repeat blood cultures drawn 24 Hours after treatment-negative


   -IV fluids as ordered





Transaminitis


   -Cannot r/o Cholangitis


   -Bilirubin noted to be 1.9 in clinic and provider believed patient looked 

jaundiced


   -Does not appear jaundiced here


   -Bilirubin here 1.6-->0.8-->0.7


   -


   -Hepatitis Panel ordered


   -Liver enzymes (clinic and here)


      -AST 53-->42-->40


      -ALT 76-->63-->61


      -Alk Phos 104-->116-->130


   -Abdominal US was ordered outpatient but ultimately obtained here on 3/15/19


 * 1.  Slightly dilated CBD with no additional biliary abnormality being seen.  

MRCP could be obtained to further evaluate.


 * 2.  Other portions of the right upper quadrant abdominal ultrasound are 

unremarkable.


 -No abdominal pain


 -MRCP obtained 3/15/19:


 * 1.  Mildly dilated CHD and CBD with no filling deficits seen to indicate 

retained stone as an etiology.  Difficult to exclude stenosis at the sphincter 

of OT as an etiology.  Formal ERCP could be considered to further evaluate.


 -Will reach GI specialist today 


 -Monitor CMPs





Resolved:


S/p Weakness/Fatigue


   -Likely 2/2 above


   -VRP ordered and pending


   -IV fluids as above





S/p MARIA TERESA (Labs from clinic and here)


   -2/2 poor oral intake and medications


   -On multiple anticholinergic drugs


   -BUN 24-->19-->18-->15


   -Creatinine 1.5-->1.4-->1.4-->1.3-->1.0


   -eGFR 35-->38-->38-->46 


   -IV fluids as ordered   


 


Chronic:


Impaired vision


Recurrent UTIs


Migraines


Hypothyroidism


Anemia





Plan:


She remains clinically stable


Continue current treatment


Routine AM labs


CM/SW for discharge planning


Other orders as indicated above


DVT prophylaxis:SCDs


PCP: Jessy Hutchinson PA-C


Discharge pending repeat blood culture after 48hrs and GI recommendations 

regarding elevated liver enzymes and abnormal MRI results

## 2019-03-16 NOTE — PCM.SN
- Free Text/Narrative


Note: 


Spoke to Dr. Ugalde and discussed case with him regarding patients elevated 

liver enzymes and abnormal MRI result with reference to possible need of an 

ERCP. He recommended HIDA Scan and Direct Bilirubin-if both normal, he states 

maybe related to infection or medications. Information relayed to  and 

wife. Unfortunately, HIDA scan could not be done this weekend so we will have 

to wait till Monday.

## 2019-03-17 RX ADMIN — Medication SCH MG: at 08:38

## 2019-03-17 NOTE — CR
Chest: Frontal view of the chest was obtained utilizing portable 

technique.

 

Comparison: Previous chest x-ray of 03/12/19.

 

Heart size and mediastinum are within normal limits for portable 

technique.  Slight left basilar atelectasis is seen.  Lungs otherwise 

are clear.  Bony structures are grossly intact.

 

Impression:

1.  Slight left basilar atelectasis.

2.  Nothing acute is otherwise seen on portable chest x-ray.

 

Diagnostic code #2

 

I agree with preliminary report from [facility], finalized on 03/16/19, 3:18

 PM Central Time

## 2019-03-18 RX ADMIN — Medication SCH MG: at 09:00

## 2019-03-18 NOTE — NM
Biliary HIDA scan with ejection fraction

 

Technique: 2.8 mCi of technetium 99m mebrofenin was given 

intravenously.  Scintigraphic imaging then obtained over the upper 

abdomen.  During the study 3 ounces of heavy whipping cream was given 

with 1 teaspoon of sugar.  Continued scintigraphic imaging was 

performed.

 

Comparison: Previous MRI cholangiogram study of 03/15/19 and right 

upper quadrant abdominal ultrasound study of 03/15/19.

 

Findings: Normal activity is seen within gallbladder and small bowel. 

 Gallbladder ejection fraction is 88%.

 

Impression:

1.  No abnormality is identified on biliary HIDA scan with normal 

ejection fraction being seen.

 

Diagnostic code #1

## 2019-03-18 NOTE — [id number]
**Discharge Summary





- Hospital Course


HPI Initial Comments: 


Bhavna Lowe is a 64 yo female who presented to our ED from [facility] with 

fevers, UTI, and bacteremia.  She was seen in the [facility] and diagnosed 

with UTI.  Her WBC was also elevated at 19.  She was sent to the ED for further 

management and at that time blood cultures were obtained fluids were given and 

a CMP was done.  She is given a dose of Rocephin and put on by mouth Cipro and 

discharged to follow up with Jessy Hutchinson PA-C.  In follow-up WBC was noted 

to be coming down along with her bilirubin and liver enzymes.  She received 

another shot of Rocephin.  Her blood cultures and returned positive for 

Escherichia coli and these were reported back to her PCP by the ED provider.  

At that time it was felt she could be treated as outpatient however she 

continued to have fever and chills.  Reports she is still able to eat and drink 

and does feel a little better.  Abdominal ultrasound was ordered and originally 

was going to be completed outpatient as it was thought she looked well jaundice.





Temp in the ED Was 98.3.  Pulse 84.  Respirations 16.  Blood pressure 110/84.  

Pulse ox 98%.  Labs are obtained: WBC 7.90.  Hemoglobin 10.6.  Hematocrit 31.5.

  She is normocytic.  Neutrophils are normal at 60.5.  Sodium is just a smidge 

low at 134.  Potassium 3.6.  Chloride 102.  Corrected 20.  Anion gap is 15.6.  

BUN is 18.  Creatinine is 1.4.  GFR is 38.  Glucose 106.  Calcium 8.5.  Total 

bilirubin 0.8.  AST is 40, ALT 61, alkaline phosphatase 1:30.  Protein is 6.5.  

Albumin is low at 2.5.  She started on IV fluids and 2 g Rocephin.  She is 

reporting pain in the right upper quadrant.





She carries a history of impaired vision, recurrent UTIs, migraines, 

hypothyroidism, anemia.  She is a DNR/DNI.  Her PCP is Jessy Hutchinson PA-C. 





Diagnosis: Stroke: No





- Discharge Data


Discharge Date: 03/18/19 (Admit date: 3/14/19)


Discharge Disposition: Home, Self-Care 01


Condition: Good





- Discharge Diagnosis/Problem(s)


(1) Bacteremia


SNOMED Code(s): 8251569


   ICD Code: R78.81 - BACTEREMIA   Status: Resolved   Priority: High   Current 

Visit: Yes   





(2) UTI, Urinary tract infectious disease


SNOMED Code(s): 13063339


   ICD Code: N39.0 - URINARY TRACT INFECTION, SITE NOT SPECIFIED   Status: 

Acute   Priority: High   Current Visit: Yes   





(3) Transaminitis


SNOMED Code(s): 550953807, 726604894


   ICD Code: R74.0 - NONSPEC ELEV OF LEVELS OF TRANSAMNS & LACTIC ACID 

DEHYDRGNSE   Status: Acute   Current Visit: Yes   





(4) Elevated bilirubin


SNOMED Code(s): 825937057


   ICD Code: R17 - UNSPECIFIED JAUNDICE   Status: Acute   Current Visit: Yes   





- Patient Summary/Data


Consults: 


 Consultations





03/14/19 20:42


Consult to Case Management/ [CONS] Routine 


Consult to Spiritual Care [CONS] Routine 


OT Evaluation and Treatment [CONS] Routine 


PT Evaluation and Treatment [CONS] Routine 











Labs Pending at D/C: 


Hepatitis panel





Recommended Follow-up Testing/Procedures: 


Follow-up with PCP within 1 week


   -Recommend re-check CBC, CMP, Magnesium at that appointment





Hospital Course: 


I/P:





Acute:


UTI 2/2 E. coli


   -Failed outpatient treatment 


   -Was seen in clinic and ED for UTI. Continued to have fever and chills


   -Was noted to be bacteremic and sent here for treatment


   -UA here shows 1+ protein, trace intact occult blood, 1+ leukocyte esterase, 

5-10 WBCs.


   -4/4 blood cultures positive for E. Coli with some resistance, however 

sensitive to Rocephin


   -UC growing E. Coli sensitive to Rocephin


   -Rocephin given in ED - continue


   -IV fluids as ordered


   -No leukocytosis


   -CRP 23.9-->17.8-->18.0-->14.5-->10.2


   -Probiotic


   -Tylenol for fever/pain





Bacteremia


   -4/4 cultures positive for E. Coli with some resistances 


   -2/2 UTI


   -Treatment as above


   -Repeat blood cultures drawn 44 Hours after treatment-negative


   -IV fluids as ordered





Transaminitis


   -Cannot r/o Cholangitis vs Gilbert Syndrome


   -Bilirubin noted to be 1.9 in clinic and provider believed patient looked 

jaundiced


   -Does not appear jaundiced here


   -Started on zosyn over concerns over cholangitis


   -Bilirubin here 1.6-->0.8-->0.7-->0.8-->0.7; Direct Bili 0.40-->0.2


   -


   -Hepatitis Panel ordered and pending 


   -Liver enzymes (clinic and here)


      -AST 53-->42-->40-->118-->143-->67


      -ALT 76-->63-->61-->131-->171-->134


      -Alk Phos 104-->116-->130-->196-->189


   -Abdominal US was ordered outpatient but ultimately obtained here on 3/15/19


 * 1.  Slightly dilated CBD with no additional biliary abnormality being seen.  

MRCP could be obtained to further evaluate.


 * 2.  Other portions of the right upper quadrant abdominal ultrasound are 

unremarkable.


 -No abdominal pain


 -MRCP obtained 3/15/19:


 * 1.  Mildly dilated CHD and CBD with no filling deficits seen to indicate 

retained stone as an etiology.  Difficult to exclude stenosis at the sphincter 

of OT as an etiology.  Formal ERCP could be considered to further evaluate.


 -HIDA scan negative


 -Monitor CMPs





Resolved:


S/p Weakness/Fatigue


   -Likely 2/2 above


   -VRP negative


   -IV fluids as above





S/p MARIA TERESA (Labs from clinic and here)


   -2/2 poor oral intake and medications


   -On multiple anticholinergic drugs


   -BUN 24-->19-->18-->15


   -Creatinine 1.5-->1.4-->1.4-->1.3-->1.0


   -eGFR 35-->38-->38-->46 


   -IV fluids as ordered   





S/P Hypomagnesemia


   -Mg 1.7-->1.9


   -2/2 inadequate intake


   -Replete and monitor


 


Chronic:


Impaired vision


Recurrent UTIs


Migraines


Hypothyroidism


Anemia





Plan:


She remains clinically stable


Continue current treatment


Routine AM labs


Continue IV Rocephin and Zosyn


CM/SW for discharge planning


Other orders as indicated above


DVT prophylaxis: SCDs


PCP: Jessy Hutchinson PA-C


Discharge pending repeat HIDA scan result in AM    





Bhavna was admitted from the ED after failed outpatient treatment for a UTI and 

bacteremia.  She started on Rocephin in the ED prior to being admitted.  Prior 

labs were reviewed and prior urine culture and blood cultures returned 

Escherichia coli sensitive to Rocephin.  Repeat blood cultures and UA were done 

2 days after initiation of treatment and all returned negative for any growth 

thus far.  She was noted to be somewhat jaundiced by her primary care provider 

so a right upper quadrant abdominal ultrasound was performed showing some CBD 

dilation but no obvious stones.  She was not having any abdominal pain.  MRCP 

was obtained that also showed some duct dilation but no retained stones. Dr. Rubio Contacted LETI Hale at [facility] to discuss the case with him. He 

suggested a HIDA scan and reported that if this is normal there is nothing more 

to do.  HIDA scan was obtained and was negative showing a normal EF. She is 

also noted to have transaminitis.  Direct bilirubin was initially elevated but 

this did trend down.  Liver enzymes trended down as well after rising.  Unsure 

if this is from Gilbert's syndrome or a prior stone that had been passed.  She 

denied any abdominal pain and is not currently jaundiced.  She has no current 

complaints.  Viral panel was negative.  Electrolytes were replaced.  CRP is 

trended down as well.  She was started on a probiotic and this will be 

continued outpatient.  She will be discharged home today on a 5 day course of 

500 mg Cipro as well as a probiotic.  She should follow-up with her PCP within 

one week.  Recommending repeating CBC, CMP, and magnesium.  She was instructed 

to contact her PCP, all walk-in clinic, or return to the ED should symptoms 

return.








- Patient Instructions


Diet: Usual Diet as Tolerated


Activity: As Tolerated


Showering/Bathing: May Shower


Notify Provider of: Fever, Increased Pain, Nausea and/or Vomiting


Other/Special Instructions: - Please take all new medications as directed.  - 

Resume all home medications and routine home activities as tolerated.  - 

Recommend repeat CBC, Mg and CMP in 1 week through your PCP's office.  - Call 

or follow up with your PCP for any questions or concerns after discharge.  - 

Follow up with your PCP in 1 week.  - Come back or seek immediate care should 

your symptoms persist or get worse





- Discharge Plan


*PRESCRIPTION DRUG MONITORING PROGRAM REVIEWED*: No


*COPY OF PRESCRIPTION DRUG MONITORING REPORT IN PATIENT CORNELL: No


Prescriptions/Med Rec: 


Ciprofloxacin HCl [Cipro] 500 mg PO BEDTIME #5 tablet


Saccharomyces Boulardii [Florastor] 250 mg PO BEDTIME #5 capsule


Home Medications: 


 Home Meds





Amitriptyline [Elavil] 50 mg PO DAILY 03/12/19 [History]


Citalopram [Citalopram HBr] 20 mg PO DAILY 03/12/19 [History]


Fluticasone Propionate [Flonase] 2 spray NASBOTH DAILY 03/12/19 [History]


Levothyroxine Sodium [Synthroid] 112 mcg PO DAILY 03/12/19 [History]


Loratadine [Claritin] 10 mg PO DAILY 03/12/19 [History]


SUMAtriptan [Sumatriptan] 1 spray MAYA ASDIRECTED PRN 03/12/19 [History]


Ciprofloxacin HCl [Cipro] 500 mg PO BEDTIME #5 tablet 03/18/19 [Rx]


Saccharomyces Boulardii [Florastor] 250 mg PO BEDTIME #5 capsule 03/18/19 [Rx]








Oxygen Therapy Mode: Room Air


Patient Handouts:  Jaundice, Adult, Easy-to-Read, Urinary Tract Infection, Adult

, Easy-to-Read, Bacteremia


Referrals: 


Abimbola Hutchinson PA-C [Primary Care Provider] - 





- Discharge Summary/Plan Comment


DC Time >30 min.: Yes (45 min)





- General Info


Date of Service: 03/18/19


Admission Dx/Problem (Free Text: 


 Admission Diagnosis/Problem





Admission Diagnosis/Problem      Bacteremia








Subjective Update: 


In to see Bhavna with Dr. Rubio. She reports she feels good and has no 

complaints. We discussed the possible causes of her transaminitis and her HIDA 

scan results. Her  is at bedside. No nursing concerns. She will be 

discharged today. 





Functional Status: Reports: Pain Controlled, Tolerating Diet, Ambulating, 

Urinating.  Denies: New Symptoms





- Review of Systems


General: Reports: No Symptoms.  Denies: Fever, Weakness, Fatigue, Malaise, 

Chills


HEENT: Reports: No Symptoms.  Denies: Headaches, Sinus Congestion, Sore Throat


Pulmonary: Reports: No Symptoms.  Denies: Shortness of Breath, Pleuritic Chest 

Pain, Cough, Sputum


Cardiovascular: Reports: No Symptoms.  Denies: Chest Pain, Palpitations, 

Dyspnea on Exertion, Orthopnea, Edema


Gastrointestinal: Reports: No Symptoms.  Denies: Abdominal Pain, Constipation, 

Diarrhea, Nausea, Vomiting


Genitourinary: Reports: No Symptoms.  Denies: Pain


Musculoskeletal: Reports: No Symptoms


Skin: Reports: No Symptoms.  Denies: Cyanosis, Jaundice


Neurological: Reports: No Symptoms.  Denies: Confusion, Dizziness, Headache, 

Numbness, Paresthesia, Pre-Existing Deficit, Tremors, Trouble Speaking, 

Difficulty Walking, Weakness, Change in Speech, Gait Disturbance


Psychiatric: Reports: No Symptoms





- Patient Data


Vitals - Most Recent: 


 Last Vital Signs











Temp  96.3 F   03/18/19 07:47


 


Pulse  72   03/18/19 07:47


 


Resp  14   03/18/19 07:47


 


BP  116/77   03/18/19 07:47


 


Pulse Ox  100   03/18/19 07:47











Weight - Most Recent: 171 lb 6.4 oz


I&O - Last 24 hours: 


 Intake & Output











 03/17/19 03/18/19 03/18/19





 22:59 06:59 14:59


 


Intake Total 1097 1100 


 


Balance 1097 1100 











Lab Results - Last 24 hrs: 


 Laboratory Results - last 24 hr











  03/18/19 03/18/19 Range/Units





  04:45 04:45 


 


WBC  10.61 H   (3.98-10.04)  K/mm3


 


RBC  3.34 L   (3.98-5.22)  M/mm3


 


Hgb  10.0 L   (11.2-15.7)  gm/L


 


Hct  30.7 L   (34.1-44.9)  %


 


MCV  91.9   (79.4-94.8)  fl


 


MCH  29.9   (25.6-32.2)  pg


 


MCHC  32.6   (32.2-35.5)  g/dl


 


RDW Std Deviation  44.7   (36.4-46.3)  fL


 


Plt Count  334   (182-369)  K/mm3


 


MPV  10.2   (9.4-12.3)  fl


 


Neut % (Auto)  64.2   (34.0-71.1)  %


 


Lymph % (Auto)  16.7 L   (19.3-51.7)  %


 


Mono % (Auto)  11.3   (4.7-12.5)  %


 


Eos % (Auto)  4.2   (0.7-5.8)  


 


Baso % (Auto)  0.8   (0.1-1.2)  %


 


Neut # (Auto)  6.80 H   (1.56-6.13)  K/mm3


 


Lymph # (Auto)  1.77   (1.18-3.74)  K/mm3


 


Mono # (Auto)  1.20 H   (0.24-0.36)  K/mm3


 


Eos # (Auto)  0.45 H   (0.04-0.36)  K/mm3


 


Baso # (Auto)  0.09 H   (0.01-0.08)  K/mm3


 


Manual Slide Review  Normal smear   


 


Sodium   140  (136-145)  mEq/L


 


Potassium   4.0  (3.5-5.1)  mEq/L


 


Chloride   106  ()  mEq/L


 


Carbon Dioxide   25  (21-32)  mEq/L


 


Anion Gap   13.0  (5-15)  


 


BUN   9  (7-18)  mg/dL


 


Creatinine   1.0  (0.55-1.02)  mg/dL


 


Est Cr Clr Drug Dosing   68.28  mL/min


 


Estimated GFR (MDRD)   56  (>60)  mL/min


 


BUN/Creatinine Ratio   9.0 L  (14-18)  


 


Glucose   92  ()  mg/dL


 


Calcium   8.6  (8.5-10.1)  mg/dL


 


Magnesium   1.9  (1.8-2.4)  mg/dl


 


Total Bilirubin   0.7  (0.2-1.0)  mg/dL


 


Direct Bilirubin   0.20  (0.0-0.2)  mg/dl


 


AST   67 H  (15-37)  U/L


 


ALT   134 H  (14-59)  U/L


 


Alkaline Phosphatase   189 H  ()  U/L


 


C-Reactive Protein   10.2 H*  (<1.0)  mg/dL


 


Total Protein   6.4  (6.4-8.2)  g/dl


 


Albumin   2.2 L  (3.4-5.0)  g/dl


 


Globulin   4.2  gm/dL


 


Albumin/Globulin Ratio   0.5 L  (1-2)  











CLAUDIA Results - Last 24 hrs: 


 Microbiology











 03/14/19 21:15 Aerobic Blood Culture - Preliminary





 Blood - Venous - Lab Draw    NO GROWTH AFTER 3 DAYS





 Anaerobic Blood Culture - Final


 


 03/14/19 21:20 Aerobic Blood Culture - Preliminary





 Blood - Venous    NO GROWTH AFTER 3 DAYS





 Anaerobic Blood Culture - Preliminary





    NO GROWTH AFTER 3 DAYS


 


 03/15/19 07:20 Urine Culture - Final





 Urine, Clean Catch    NO GROWTH AFTER 2 DAYS











Med Orders - Current: 


 Current Medications





Acetaminophen (Tylenol)  650 mg PO Q4H PRN


   PRN Reason: Pain (Mild 1-3)/fever


   Last Admin: 03/16/19 18:27 Dose:  650 mg


Hydrocodone Bitart/Acetaminophen (Norco 325-5 Mg)  1 tab PO Q4H PRN


   PRN Reason: Pain (moderate 4-6)


Albuterol/Ipratropium (Duoneb 3.0-0.5 Mg/3 Ml)  3 ml NEB Q4H PRN


   PRN Reason: Shortness Of Breath/wheezing


Amitriptyline HCl (Elavil)  50 mg PO BEDTIME [facility]


   Last Admin: 03/17/19 21:25 Dose:  50 mg


Benzocaine/Menthol (Cepacol Sore Throat)  1 lozenge MUCMEM Q2H PRN


   PRN Reason: Sore Throat


   Last Admin: 03/16/19 21:31 Dose:  1 lozenge


Bisacodyl (Dulcolax)  5 mg PO DAILY PRN


   PRN Reason: Constipation


Citalopram Hydrobromide (Celexa)  20 mg PO DAILY [facility]


   Last Admin: 03/18/19 09:00 Dose:  20 mg


Docusate Sodium (Colace)  100 mg PO BID PRN


   PRN Reason: Constipation


Flunisolide (Nasalide Nasal Spray)  0 ml NASBOTH Q12H [facility]


   Last Admin: 03/18/19 09:00 Dose:  2 spray


Hydralazine HCl (Apresoline)  20 mg IVPUSH Q4H PRN


   PRN Reason: Hypertension


Hydromorphone HCl (Dilaudid)  0.25 mg IVPUSH Q2H PRN


   PRN Reason: Pain (severe 7-10)


Promethazine HCl 6.25 mg/ (Sodium Chloride)  50.25 mls @ 100 mls/hr IV Q6H PRN


   PRN Reason: Nausea/Vomiting


Ceftriaxone Sodium 2 gm/ (Sodium Chloride)  100 mls @ 200 mls/hr IV Q24H [facility]


   Last Admin: 03/17/19 17:21 Dose:  200 mls/hr


Piperacillin Sod/Tazobactam (Sod 4.5 gm/ Sodium Chloride)  100 mls @ 25 mls/hr 

IV Q8H [facility]


   Last Admin: 03/18/19 01:46 Dose:  25 mls/hr


Levothyroxine Sodium (Levothyroxine)  112 mcg PO ACBREAKFAST [facility]


   Last Admin: 03/18/19 05:33 Dose:  112 mcg


Loratadine (Claritin)  10 mg PO DAILY [facility]


   Last Admin: 03/18/19 09:00 Dose:  10 mg


Lorazepam (Ativan)  2 mg IVPUSH Q4H PRN


   PRN Reason: Seizures


Lorazepam (Ativan)  0.5 mg IV Q6H PRN


   PRN Reason: Anxiety


   Last Admin: 03/17/19 00:39 Dose:  0.5 mg


Magnesium Sulfate (Pharmacy To Dose - Magnesium Replacement)  1 dose .XX 

ASDIRECTED [facility]


Metoprolol Tartrate (Lopressor)  5 mg IVPUSH Q4H PRN


   PRN Reason: Tachycardia


Ondansetron HCl (Zofran)  4 mg IV Q6H PRN


   PRN Reason: Nausea/Vomiting


Sumatriptan [ (Sumatriptan] 1 Spray)  0 each MAYA ASDIRECTED PRN


   PRN Reason: Headache


Polyethylene Glycol (Miralax)  17 gm PO DAILY PRN


   PRN Reason: Constipation


Potassium Chloride (Pharmacy To Dose - Potassium Replacement)  1 dose .XX 

ASDIRECTED [facility]


Saccharomyces Boulardii (Florastor)  250 mg PO DAILY [facility]


   Last Admin: 03/18/19 09:00 Dose:  250 mg


Senna/Docusate Sodium (Senna Plus)  1 tab PO BID PRN


   PRN Reason: Constipation


Sodium Chloride (Saline Flush)  10 ml FLUSH ASDIRECTED PRN


   PRN Reason: Keep Vein Open


   Last Admin: 03/14/19 17:57 Dose:  10 ml


Temazepam (Restoril)  7.5 mg PO BEDTIME PRN


   PRN Reason: Sleep


   Last Admin: 03/15/19 20:41 Dose:  7.5 mg





Discontinued Medications





Ceftriaxone Sodium 2 gm/ (Sodium Chloride)  100 mls @ 200 mls/hr IV ONETIME ONE


   Stop: 03/14/19 18:00


   Last Admin: 03/14/19 18:00 Dose:  Not Given


Sodium Chloride (Normal Saline)  1,000 mls @ 125 mls/hr IV ASDIRECTED [facility]


   Last Admin: 03/16/19 21:29 Dose:  125 mls/hr


Ceftriaxone Sodium 2 gm/ (Sodium Chloride)  100 mls @ 200 mls/hr IV ONETIME ONE


   Stop: 03/14/19 18:14


   Last Admin: 03/14/19 17:56 Dose:  200 mls/hr


Ceftriaxone Sodium 2 gm/ (Sodium Chloride)  100 mls @ 200 mls/hr IV Q24H [facility]


   Stop: 03/15/19 18:01


   Last Admin: 03/15/19 17:56 Dose:  200 mls/hr


Piperacillin Sod/Tazobactam (Sod 4.5 gm/ Sodium Chloride)  100 mls @ 200 mls/hr 

IV ONETIME ONE


   Stop: 03/16/19 09:29


   Last Admin: 03/16/19 09:21 Dose:  200 mls/hr


Magnesium Oxide (Magnesium Oxide)  800 mg PO ONETIME ONE


   Stop: 03/16/19 11:01


   Last Admin: 03/16/19 11:16 Dose:  800 mg


Magnesium Oxide (Magnesium Oxide)  800 mg PO Q4H [facility]


   Stop: 03/17/19 14:46


   Last Admin: 03/17/19 14:39 Dose:  800 mg


Potassium Chloride (Klor-Con M20)  40 meq PO Q4H [facility]


   Stop: 03/15/19 01:46


   Last Admin: 03/15/19 00:57 Dose:  40 meq











- Exam


Quality Assessment: Reports: DVT Prophylaxis


General: Reports: Alert, Oriented, Cooperative, No Acute Distress


HEENT: Reports: Pupils Equal, Pupils Reactive, EOMI, Mucous Membr. Moist/Pink


Neck: Reports: Supple, Trachea Midline


Lungs: Reports: Clear to Auscultation, Normal Respiratory Effort


Cardiovascular: Reports: Regular Rate, Regular Rhythm


GI/Abdominal Exam: Normal Bowel Sounds, Soft, Non-Tender, No Organomegaly, No 

Distention


 (Female) Exam: Deferred


Rectal (Female) Exam: Deferred


Back Exam: Reports: Normal Inspection, Full Range of Motion


Extremities: Normal Inspection, Normal Range of Motion, Non-Tender, No Pedal 

Edema, Normal Capillary Refill


Skin: Reports: Warm, Dry, Intact


Neurological: Reports: No New Focal Deficit


Psy/Mental Status: Reports: Alert, Normal Affect, Normal Mood

## 2022-03-28 NOTE — PCM.PN
- General Info


Date of Service: 03/17/19


Admission Dx/Problem (Free Text): 


 Admission Diagnosis/Problem





Admission Diagnosis/Problem      Bacteremia








Subjective Update: 


Follow Up





Functional Status: Reports: Pain Controlled, Tolerating Diet, Ambulating, 

Urinating.  Denies: New Symptoms





- Review of Systems


General: Denies: Fever, Weakness, Fatigue, Malaise, Chills, Night Sweats


HEENT: Reports: No Symptoms


Pulmonary: Denies: Shortness of Breath


Cardiovascular: Denies: Chest Pain, Dyspnea on Exertion, Orthopnea


Gastrointestinal: Reports: Flatus.  Denies: Abdominal Pain, Constipation, 

Decreased Appetite, Diarrhea, Melena, Nausea, Vomiting


Genitourinary: Reports: No Symptoms


Musculoskeletal: Reports: No Symptoms


Skin: Denies: Cyanosis, Jaundice, Pallor, Diaphoresis, Bruising, Pruritis


Neurological: Denies: Confusion, Dizziness, Numbness, Paresthesia, Difficulty 

Walking, Weakness


Psychiatric: Denies: Confusion, Mood Lability, Anxiety, Agitation, Cravings





- Patient Data


Vitals - Most Recent: 


 Last Vital Signs











Temp  36.8 C   03/17/19 08:36


 


Pulse  64   03/17/19 08:36


 


Resp  14   03/17/19 05:29


 


BP  131/67   03/17/19 08:36


 


Pulse Ox  98   03/17/19 08:36











Weight - Most Recent: 79.379 kg


I&O - Last 24 Hours: 


 Intake & Output











 03/16/19 03/17/19 03/17/19





 22:59 06:59 14:59


 


Intake Total 2053 2550 180


 


Balance 2053 2550 180











Lab Results Last 24 Hours: 


 Laboratory Results - last 24 hr











  03/16/19 03/16/19 03/16/19 Range/Units





  13:33 13:53 13:53 


 


WBC     (3.98-10.04)  K/mm3


 


RBC     (3.98-5.22)  M/mm3


 


Hgb     (11.2-15.7)  gm/L


 


Hct     (34.1-44.9)  %


 


MCV     (79.4-94.8)  fl


 


MCH     (25.6-32.2)  pg


 


MCHC     (32.2-35.5)  g/dl


 


RDW Std Deviation     (36.4-46.3)  fL


 


Plt Count     (182-369)  K/mm3


 


MPV     (9.4-12.3)  fl


 


Neut % (Auto)     (34.0-71.1)  %


 


Lymph % (Auto)     (19.3-51.7)  %


 


Mono % (Auto)     (4.7-12.5)  %


 


Eos % (Auto)     (0.7-5.8)  


 


Baso % (Auto)     (0.1-1.2)  %


 


Neut # (Auto)     (1.56-6.13)  K/mm3


 


Lymph # (Auto)     (1.18-3.74)  K/mm3


 


Mono # (Auto)     (0.24-0.36)  K/mm3


 


Eos # (Auto)     (0.04-0.36)  K/mm3


 


Baso # (Auto)     (0.01-0.08)  K/mm3


 


Manual Slide Review     


 


Sodium     (136-145)  mEq/L


 


Potassium     (3.5-5.1)  mEq/L


 


Chloride     ()  mEq/L


 


Carbon Dioxide     (21-32)  mEq/L


 


Anion Gap     (5-15)  


 


BUN     (7-18)  mg/dL


 


Creatinine     (0.55-1.02)  mg/dL


 


Est Cr Clr Drug Dosing     mL/min


 


Estimated GFR (MDRD)     (>60)  mL/min


 


BUN/Creatinine Ratio     (14-18)  


 


Glucose     ()  mg/dL


 


Calcium     (8.5-10.1)  mg/dL


 


Magnesium     (1.8-2.4)  mg/dl


 


Total Bilirubin     (0.2-1.0)  mg/dL


 


Direct Bilirubin  0.40 H    (0.0-0.2)  mg/dl


 


AST     (15-37)  U/L


 


ALT     (14-59)  U/L


 


Alkaline Phosphatase     ()  U/L


 


CK-MB (CK-2)    < 0.5  (0-3.6)  ng/ml


 


Troponin I   < 0.017   (0.00-0.056)  ng/mL


 


C-Reactive Protein     (<1.0)  mg/dL


 


Total Protein     (6.4-8.2)  g/dl


 


Albumin     (3.4-5.0)  g/dl


 


Globulin     gm/dL


 


Albumin/Globulin Ratio     (1-2)  














  03/17/19 03/17/19 Range/Units





  04:10 04:42 


 


WBC  9.80   (3.98-10.04)  K/mm3


 


RBC  3.13 L   (3.98-5.22)  M/mm3


 


Hgb  9.5 L   (11.2-15.7)  gm/L


 


Hct  28.8 L   (34.1-44.9)  %


 


MCV  92.0   (79.4-94.8)  fl


 


MCH  30.4   (25.6-32.2)  pg


 


MCHC  33.0   (32.2-35.5)  g/dl


 


RDW Std Deviation  44.0   (36.4-46.3)  fL


 


Plt Count  220   (182-369)  K/mm3


 


MPV  10.5   (9.4-12.3)  fl


 


Neut % (Auto)  68.1   (34.0-71.1)  %


 


Lymph % (Auto)  12.2 L   (19.3-51.7)  %


 


Mono % (Auto)  13.9 H   (4.7-12.5)  %


 


Eos % (Auto)  3.2   (0.7-5.8)  


 


Baso % (Auto)  0.4   (0.1-1.2)  %


 


Neut # (Auto)  6.67 H   (1.56-6.13)  K/mm3


 


Lymph # (Auto)  1.20   (1.18-3.74)  K/mm3


 


Mono # (Auto)  1.36 H   (0.24-0.36)  K/mm3


 


Eos # (Auto)  0.31   (0.04-0.36)  K/mm3


 


Baso # (Auto)  0.04   (0.01-0.08)  K/mm3


 


Manual Slide Review  Normal smear   


 


Sodium   141  (136-145)  mEq/L


 


Potassium   3.9  (3.5-5.1)  mEq/L


 


Chloride   108 H  ()  mEq/L


 


Carbon Dioxide   22  (21-32)  mEq/L


 


Anion Gap   14.9  (5-15)  


 


BUN   10  (7-18)  mg/dL


 


Creatinine   1.0  (0.55-1.02)  mg/dL


 


Est Cr Clr Drug Dosing   68.28  mL/min


 


Estimated GFR (MDRD)   56  (>60)  mL/min


 


BUN/Creatinine Ratio   10.0 L  (14-18)  


 


Glucose   99  ()  mg/dL


 


Calcium   8.4 L  (8.5-10.1)  mg/dL


 


Magnesium   1.7 L  (1.8-2.4)  mg/dl


 


Total Bilirubin   0.8  (0.2-1.0)  mg/dL


 


Direct Bilirubin    (0.0-0.2)  mg/dl


 


AST   143 H  (15-37)  U/L


 


ALT   171 H  (14-59)  U/L


 


Alkaline Phosphatase   196 H  ()  U/L


 


CK-MB (CK-2)    (0-3.6)  ng/ml


 


Troponin I    (0.00-0.056)  ng/mL


 


C-Reactive Protein   14.5 H*  (<1.0)  mg/dL


 


Total Protein   6.1 L  (6.4-8.2)  g/dl


 


Albumin   2.1 L  (3.4-5.0)  g/dl


 


Globulin   4.0  gm/dL


 


Albumin/Globulin Ratio   0.5 L  (1-2)  











Gregory Results Last 24 Hours: 


 Microbiology











 03/14/19 21:15 Aerobic Blood Culture - Preliminary





 Blood - Venous - Lab Draw    NO GROWTH AFTER 2 DAYS





 Anaerobic Blood Culture - Final


 


 03/14/19 21:20 Aerobic Blood Culture - Preliminary





 Blood - Venous    NO GROWTH AFTER 2 DAYS





 Anaerobic Blood Culture - Preliminary





    NO GROWTH AFTER 2 DAYS


 


 03/15/19 07:20 Urine Culture - Preliminary





 Urine, Clean Catch    NO GROWTH AFTER 1 DAY











Med Orders - Current: 


 Current Medications





Acetaminophen (Tylenol)  650 mg PO Q4H PRN


   PRN Reason: Pain (Mild 1-3)/fever


   Last Admin: 03/16/19 18:27 Dose:  650 mg


Hydrocodone Bitart/Acetaminophen (Norco 325-5 Mg)  1 tab PO Q4H PRN


   PRN Reason: Pain (moderate 4-6)


Albuterol/Ipratropium (Duoneb 3.0-0.5 Mg/3 Ml)  3 ml NEB Q4H PRN


   PRN Reason: Shortness Of Breath/wheezing


Amitriptyline HCl (Elavil)  50 mg PO BEDTIME [facility]


   Last Admin: 03/16/19 21:31 Dose:  50 mg


Benzocaine/Menthol (Cepacol Sore Throat)  1 lozenge MUCMEM Q2H PRN


   PRN Reason: Sore Throat


   Last Admin: 03/16/19 21:31 Dose:  1 lozenge


Bisacodyl (Dulcolax)  5 mg PO DAILY PRN


   PRN Reason: Constipation


Citalopram Hydrobromide (Celexa)  20 mg PO DAILY [facility]


   Last Admin: 03/17/19 08:39 Dose:  20 mg


Docusate Sodium (Colace)  100 mg PO BID PRN


   PRN Reason: Constipation


Flunisolide (Nasalide Nasal Spray)  0 ml NASBOTH Q12H [facility]


   Last Admin: 03/17/19 08:40 Dose:  Not Given


Hydralazine HCl (Apresoline)  20 mg IVPUSH Q4H PRN


   PRN Reason: Hypertension


Hydromorphone HCl (Dilaudid)  0.25 mg IVPUSH Q2H PRN


   PRN Reason: Pain (severe 7-10)


Promethazine HCl 6.25 mg/ (Sodium Chloride)  50.25 mls @ 100 mls/hr IV Q6H PRN


   PRN Reason: Nausea/Vomiting


Ceftriaxone Sodium 2 gm/ (Sodium Chloride)  100 mls @ 200 mls/hr IV Q24H [facility]


   Last Admin: 03/16/19 17:20 Dose:  200 mls/hr


Piperacillin Sod/Tazobactam (Sod 4.5 gm/ Sodium Chloride)  100 mls @ 25 mls/hr 

IV Q8H [facility]


   Last Admin: 03/17/19 09:33 Dose:  25 mls/hr


Levothyroxine Sodium (Levothyroxine)  112 mcg PO ACBREAKFAST [facility]


   Last Admin: 03/17/19 05:31 Dose:  112 mcg


Loratadine (Claritin)  10 mg PO DAILY [facility]


   Last Admin: 03/17/19 08:39 Dose:  10 mg


Lorazepam (Ativan)  2 mg IVPUSH Q4H PRN


   PRN Reason: Seizures


Lorazepam (Ativan)  0.5 mg IV Q6H PRN


   PRN Reason: Anxiety


   Last Admin: 03/17/19 00:39 Dose:  0.5 mg


Magnesium Oxide (Magnesium Oxide)  800 mg PO Q4H [facility]


   Stop: 03/17/19 14:46


   Last Admin: 03/17/19 12:24 Dose:  800 mg


Magnesium Sulfate (Pharmacy To Dose - Magnesium Replacement)  1 dose .XX 

ASDIRECTED [facility]


Metoprolol Tartrate (Lopressor)  5 mg IVPUSH Q4H PRN


   PRN Reason: Tachycardia


Ondansetron HCl (Zofran)  4 mg IV Q6H PRN


   PRN Reason: Nausea/Vomiting


Sumatriptan [ (Sumatriptan] 1 Spray)  0 each MAYA ASDIRECTED PRN


   PRN Reason: Headache


Polyethylene Glycol (Miralax)  17 gm PO DAILY PRN


   PRN Reason: Constipation


Potassium Chloride (Pharmacy To Dose - Potassium Replacement)  1 dose .XX 

ASDIRECTED [facility]


Saccharomyces Boulardii (Florastor)  250 mg PO DAILY [facility]


   Last Admin: 03/17/19 08:38 Dose:  250 mg


Senna/Docusate Sodium (Senna Plus)  1 tab PO BID PRN


   PRN Reason: Constipation


Sodium Chloride (Saline Flush)  10 ml FLUSH ASDIRECTED PRN


   PRN Reason: Keep Vein Open


   Last Admin: 03/14/19 17:57 Dose:  10 ml


Temazepam (Restoril)  7.5 mg PO BEDTIME PRN


   PRN Reason: Sleep


   Last Admin: 03/15/19 20:41 Dose:  7.5 mg





Discontinued Medications





Ceftriaxone Sodium 2 gm/ (Sodium Chloride)  100 mls @ 200 mls/hr IV ONETIME ONE


   Stop: 03/14/19 18:00


   Last Admin: 03/14/19 18:00 Dose:  Not Given


Sodium Chloride (Normal Saline)  1,000 mls @ 125 mls/hr IV ASDIRECTED [facility]


   Last Admin: 03/16/19 21:29 Dose:  125 mls/hr


Ceftriaxone Sodium 2 gm/ (Sodium Chloride)  100 mls @ 200 mls/hr IV ONETIME ONE


   Stop: 03/14/19 18:14


   Last Admin: 03/14/19 17:56 Dose:  200 mls/hr


Ceftriaxone Sodium 2 gm/ (Sodium Chloride)  100 mls @ 200 mls/hr IV Q24H [facility]


   Stop: 03/15/19 18:01


   Last Admin: 03/15/19 17:56 Dose:  200 mls/hr


Piperacillin Sod/Tazobactam (Sod 4.5 gm/ Sodium Chloride)  100 mls @ 200 mls/hr 

IV ONETIME ONE


   Stop: 03/16/19 09:29


   Last Admin: 03/16/19 09:21 Dose:  200 mls/hr


Magnesium Oxide (Magnesium Oxide)  800 mg PO ONETIME ONE


   Stop: 03/16/19 11:01


   Last Admin: 03/16/19 11:16 Dose:  800 mg


Potassium Chloride (Klor-Con M20)  40 meq PO Q4H [facility]


   Stop: 03/15/19 01:46


   Last Admin: 03/15/19 00:57 Dose:  40 meq











- Exam


General: Alert, Oriented, Cooperative, No Acute Distress


HEENT: Pupils Equal, Pupils Reactive, EOMI, Mucous Membr. Moist/Pink


Neck: Supple, Trachea Midline


Lungs: Clear to Auscultation, Normal Respiratory Effort


Cardiovascular: Regular Rate, Regular Rhythm


GI/Abdominal Exam: Normal Bowel Sounds, Soft, Non-Tender, No Organomegaly, No 

Distention, No Abnormal Bruit, No Mass


 (Female) Exam: Deferred


Back Exam: Normal Inspection, Decreased Range of Motion


Extremities: Normal Inspection, Normal Range of Motion, Non-Tender, No Pedal 

Edema, Normal Capillary Refill


Peripheral Pulses: 2+: Dorsalis Pedis (L), Dorsalis Pedis (R)


Skin: Warm, Dry, Intact


Neurological: No New Focal Deficit, Normal Gait


Psy/Mental Status: Alert, Normal Affect, Normal Mood





- Problem List Review


Problem List Initiated/Reviewed/Updated: Yes





- My Orders


Last 24 Hours: 


My Active Orders





03/16/19 13:40


EKG 12 Lead [EK] Stat 





03/16/19 14:42


Incentive Spirometry [RT Incentive Spirometry] [RC] ASDIRECTED 





03/16/19 17:00


Piperacillin/Tazobactam [Piperacil-Tazobact] 4.5 gm   Sodium Chloride 0.9% [

Normal Saline] 100 ml IV Q8H 





03/16/19 18:00


cefTRIAXone [Rocephin] 2 gm   Sodium Chloride 0.9% [Normal Saline] 100 ml IV 

Q24H 





03/17/19 10:45


Magnesium Oxide   800 mg PO Q4H 





03/18/19 05:11


BILIRUBIN DIRECT [CHEM] AM 


C-REACTIVE PROTEIN [CHEM] AM 


CBC WITH AUTO DIFF [HEME] AM 


MAGNESIUM [CHEM] AM 





03/18/19 09:30


Cholescintigraphy w Pharm Int [NM] Routine 














- Plan


Plan:: 


I/P:





Acute:


UTI 2/2 E. coli


   -Failed outpatient treatment 


   -Was seen in clinic and ED for UTI. Continued to have fever and chills


   -Was noted to be bacteremic and sent here for treatment


   -UA here shows 1+ protein, trace intact occult blood, 1+ leukocyte esterase, 

5-10 WBCs.


   -4/4 blood cultures positive for E. Coli with some resistance, however 

sensitive to Rocephin


   -UC growing E. Coli sensitive to Rocephin


   -Rocephin given in ED - continue


   -IV fluids as ordered


   -No leukocytosis


   -CRP 23.9-->17.8-->18.0-->14.5


   -Probiotic


   -Tylenol for fever/pain





Bacteremia


   -4/4 cultures positive for E. Coli with some resistances 


   -2/2 UTI


   -Treatment as above


   -Repeat blood cultures drawn 44 Hours after treatment-negative


   -IV fluids as ordered





Transaminitis


   -Cannot r/o Cholangitis vs Gilbert Syndrome


   -Bilirubin noted to be 1.9 in clinic and provider believed patient looked 

jaundiced


   -Does not appear jaundiced here


   -Bilirubin here 1.6-->0.8-->0.7-->0.8; Direct Bili 0.40


   -


   -Hepatitis Panel ordered


   -Liver enzymes (clinic and here)


      -AST 53-->42-->40-->118-->143


      -ALT 76-->63-->61-->131-->171


      -Alk Phos 104-->116-->130


   -Abdominal US was ordered outpatient but ultimately obtained here on 3/15/19


 * 1.  Slightly dilated CBD with no additional biliary abnormality being seen.  

MRCP could be obtained to further evaluate.


 * 2.  Other portions of the right upper quadrant abdominal ultrasound are 

unremarkable.


 -No abdominal pain


 -MRCP obtained 3/15/19:


 * 1.  Mildly dilated CHD and CBD with no filling deficits seen to indicate 

retained stone as an etiology.  Difficult to exclude stenosis at the sphincter 

of OT as an etiology.  Formal ERCP could be considered to further evaluate.


 -HIDA scan in AM


 -Monitor CMPs


 


Hypomagnesemia


   -Mg 1.7


   -2/2 inadequate intake


   -Replete and monitor





Resolved:


S/p Weakness/Fatigue


   -Likely 2/2 above


   -VRP ordered and pending


   -IV fluids as above





S/p MARIA TERESA (Labs from clinic and here)


   -2/2 poor oral intake and medications


   -On multiple anticholinergic drugs


   -BUN 24-->19-->18-->15


   -Creatinine 1.5-->1.4-->1.4-->1.3-->1.0


   -eGFR 35-->38-->38-->46 


   -IV fluids as ordered   


 


Chronic:


Impaired vision


Recurrent UTIs


Migraines


Hypothyroidism


Anemia





Plan:


She remains clinically stable


Continue current treatment


Routine AM labs


Continue IV Rocephin and Zosyn


CM/SW for discharge planning


Other orders as indicated above


DVT prophylaxis: SCDs


PCP: Jessy Hutchinson PA-C


Discharge pending repeat HIDA scan result in AM O positive